# Patient Record
Sex: FEMALE | Race: WHITE | HISPANIC OR LATINO | Employment: OTHER | URBAN - METROPOLITAN AREA
[De-identification: names, ages, dates, MRNs, and addresses within clinical notes are randomized per-mention and may not be internally consistent; named-entity substitution may affect disease eponyms.]

---

## 2017-05-28 ENCOUNTER — HOSPITAL ENCOUNTER (EMERGENCY)
Facility: MEDICAL CENTER | Age: 22
End: 2017-05-28
Payer: MEDICAID

## 2017-05-28 VITALS
HEIGHT: 59 IN | BODY MASS INDEX: 37.91 KG/M2 | RESPIRATION RATE: 18 BRPM | OXYGEN SATURATION: 100 % | TEMPERATURE: 98.5 F | WEIGHT: 188.05 LBS | HEART RATE: 75 BPM | DIASTOLIC BLOOD PRESSURE: 67 MMHG | SYSTOLIC BLOOD PRESSURE: 113 MMHG

## 2017-05-28 PROCEDURE — 302449 STATCHG TRIAGE ONLY (STATISTIC)

## 2017-05-28 ASSESSMENT — PAIN SCALES - GENERAL: PAINLEVEL_OUTOF10: 4

## 2017-05-28 NOTE — ED NOTES
Stephanie Farnsworth  21 y.o.  Chief Complaint   Patient presents with   • Dizziness   • N/V     Pt 7 wks pregnant unable to keep down fluids, sts feeling dizzy n/v. Pt feels dehydrated. vss no acute distress noted. Pt explained triage process. Pt aware to inform staff of any changes.

## 2017-11-03 ENCOUNTER — HOSPITAL ENCOUNTER (OUTPATIENT)
Facility: MEDICAL CENTER | Age: 22
End: 2017-11-03
Attending: OBSTETRICS & GYNECOLOGY | Admitting: OBSTETRICS & GYNECOLOGY
Payer: MEDICAID

## 2017-11-29 ENCOUNTER — APPOINTMENT (OUTPATIENT)
Dept: ADMISSIONS | Facility: MEDICAL CENTER | Age: 22
End: 2017-11-29
Attending: OBSTETRICS & GYNECOLOGY
Payer: MEDICAID

## 2017-12-22 ENCOUNTER — HOSPITAL ENCOUNTER (INPATIENT)
Dept: HOSPITAL 8 - LDIP | Age: 22
LOS: 2 days | Discharge: HOME | End: 2017-12-24
Attending: OBSTETRICS & GYNECOLOGY | Admitting: OBSTETRICS & GYNECOLOGY
Payer: MEDICAID

## 2017-12-22 VITALS — BODY MASS INDEX: 39.47 KG/M2 | WEIGHT: 195.77 LBS | HEIGHT: 59 IN

## 2017-12-22 VITALS — DIASTOLIC BLOOD PRESSURE: 69 MMHG | SYSTOLIC BLOOD PRESSURE: 118 MMHG

## 2017-12-22 DIAGNOSIS — K83.1: ICD-10-CM

## 2017-12-22 DIAGNOSIS — J45.909: ICD-10-CM

## 2017-12-22 DIAGNOSIS — Z87.891: ICD-10-CM

## 2017-12-22 DIAGNOSIS — O34.211: Primary | ICD-10-CM

## 2017-12-22 DIAGNOSIS — Z3A.38: ICD-10-CM

## 2017-12-22 LAB
ANISOCYTOSIS BLD QL SMEAR: (no result)
HCT VFR BLD CALC: 30.6 % (ref 34.6–47.8)
HGB BLD-MCNC: 9.5 G/DL (ref 11.7–16.4)
HYPOCHROMIA BLD QL SMEAR: (no result)
MICROCYTES BLD QL SMEAR: (no result)
OVALOCYTES BLD QL SMEAR: (no result)
POLYCHROMASIA BLD QL SMEAR: (no result)
WBC # BLD AUTO: 11.8 X10^3/UL (ref 3.4–10)

## 2017-12-22 PROCEDURE — 36415 COLL VENOUS BLD VENIPUNCTURE: CPT

## 2017-12-22 PROCEDURE — 86850 RBC ANTIBODY SCREEN: CPT

## 2017-12-22 PROCEDURE — 85025 COMPLETE CBC W/AUTO DIFF WBC: CPT

## 2017-12-22 PROCEDURE — 86900 BLOOD TYPING SEROLOGIC ABO: CPT

## 2017-12-22 PROCEDURE — 82803 BLOOD GASES ANY COMBINATION: CPT

## 2017-12-22 RX ADMIN — FENTANYL CITRATE PRN MCG: 50 INJECTION INTRAMUSCULAR; INTRAVENOUS at 20:26

## 2017-12-22 RX ADMIN — OXYCODONE HYDROCHLORIDE AND ACETAMINOPHEN PRN TAB: 5; 325 TABLET ORAL at 23:58

## 2017-12-22 RX ADMIN — FENTANYL CITRATE PRN MCG: 50 INJECTION INTRAMUSCULAR; INTRAVENOUS at 21:04

## 2017-12-22 RX ADMIN — Medication SCH MLS/HR: at 19:25

## 2017-12-22 RX ADMIN — FENTANYL CITRATE PRN MCG: 50 INJECTION INTRAMUSCULAR; INTRAVENOUS at 20:38

## 2017-12-22 RX ADMIN — SODIUM CHLORIDE, SODIUM LACTATE, POTASSIUM CHLORIDE, AND CALCIUM CHLORIDE SCH MLS/HR: .6; .31; .03; .02 INJECTION, SOLUTION INTRAVENOUS at 19:55

## 2017-12-22 RX ADMIN — FENTANYL CITRATE PRN MCG: 50 INJECTION INTRAMUSCULAR; INTRAVENOUS at 20:07

## 2017-12-22 RX ADMIN — SODIUM CHLORIDE, SODIUM LACTATE, POTASSIUM CHLORIDE, AND CALCIUM CHLORIDE SCH MLS/HR: .6; .31; .03; .02 INJECTION, SOLUTION INTRAVENOUS at 19:25

## 2017-12-23 VITALS — SYSTOLIC BLOOD PRESSURE: 104 MMHG | DIASTOLIC BLOOD PRESSURE: 60 MMHG

## 2017-12-23 VITALS — DIASTOLIC BLOOD PRESSURE: 69 MMHG | SYSTOLIC BLOOD PRESSURE: 107 MMHG

## 2017-12-23 VITALS — DIASTOLIC BLOOD PRESSURE: 62 MMHG | SYSTOLIC BLOOD PRESSURE: 99 MMHG

## 2017-12-23 VITALS — DIASTOLIC BLOOD PRESSURE: 72 MMHG | SYSTOLIC BLOOD PRESSURE: 108 MMHG

## 2017-12-23 VITALS — SYSTOLIC BLOOD PRESSURE: 101 MMHG | DIASTOLIC BLOOD PRESSURE: 63 MMHG

## 2017-12-23 LAB
ANISOCYTOSIS BLD QL SMEAR: (no result)
HCT VFR BLD CALC: 23.9 % (ref 34.6–47.8)
HGB BLD-MCNC: 7.4 G/DL (ref 11.7–16.4)
HYPOCHROMIA BLD QL SMEAR: (no result)
MICROCYTES BLD QL SMEAR: (no result)
OVALOCYTES BLD QL SMEAR: (no result)
POLYCHROMASIA BLD QL SMEAR: (no result)
WBC # BLD AUTO: 9.1 X10^3/UL (ref 3.4–10)

## 2017-12-23 RX ADMIN — Medication SCH MLS/HR: at 05:25

## 2017-12-23 RX ADMIN — IBUPROFEN PRN MG: 200 TABLET, FILM COATED ORAL at 11:04

## 2017-12-23 RX ADMIN — OXYCODONE HYDROCHLORIDE AND ACETAMINOPHEN PRN TAB: 5; 325 TABLET ORAL at 12:33

## 2017-12-23 RX ADMIN — FERROUS SULFATE TAB 325 MG (65 MG ELEMENTAL FE) SCH MG: 325 (65 FE) TAB at 07:44

## 2017-12-23 RX ADMIN — OXYCODONE HYDROCHLORIDE AND ACETAMINOPHEN PRN TAB: 5; 325 TABLET ORAL at 04:00

## 2017-12-23 RX ADMIN — IBUPROFEN PRN MG: 200 TABLET, FILM COATED ORAL at 16:36

## 2017-12-23 RX ADMIN — KETOROLAC TROMETHAMINE SCH MG: 30 INJECTION, SOLUTION INTRAMUSCULAR at 07:44

## 2017-12-23 RX ADMIN — KETOROLAC TROMETHAMINE SCH MG: 30 INJECTION, SOLUTION INTRAMUSCULAR at 01:32

## 2017-12-23 RX ADMIN — KETOROLAC TROMETHAMINE SCH MG: 30 INJECTION, SOLUTION INTRAMUSCULAR at 01:30

## 2017-12-23 RX ADMIN — IBUPROFEN PRN MG: 200 TABLET, FILM COATED ORAL at 22:37

## 2017-12-23 RX ADMIN — SODIUM CHLORIDE, SODIUM LACTATE, POTASSIUM CHLORIDE, AND CALCIUM CHLORIDE SCH MLS/HR: .6; .31; .03; .02 INJECTION, SOLUTION INTRAVENOUS at 05:25

## 2017-12-23 RX ADMIN — DOCUSATE SODIUM PRN MG: 100 CAPSULE, LIQUID FILLED ORAL at 20:33

## 2017-12-23 RX ADMIN — OXYCODONE HYDROCHLORIDE AND ACETAMINOPHEN PRN TAB: 5; 325 TABLET ORAL at 20:33

## 2017-12-23 RX ADMIN — DOCUSATE SODIUM PRN MG: 100 CAPSULE, LIQUID FILLED ORAL at 07:44

## 2017-12-23 RX ADMIN — FERROUS SULFATE TAB 325 MG (65 MG ELEMENTAL FE) SCH MG: 325 (65 FE) TAB at 16:35

## 2017-12-23 RX ADMIN — SODIUM CHLORIDE, SODIUM LACTATE, POTASSIUM CHLORIDE, AND CALCIUM CHLORIDE SCH MLS/HR: .6; .31; .03; .02 INJECTION, SOLUTION INTRAVENOUS at 03:25

## 2017-12-23 RX ADMIN — OXYCODONE HYDROCHLORIDE AND ACETAMINOPHEN PRN TAB: 5; 325 TABLET ORAL at 16:36

## 2017-12-23 RX ADMIN — PRENATAL VIT W/ FE FUMARATE-FA TAB 27-0.8 MG SCH EACH: 27-0.8 TAB at 07:45

## 2017-12-23 RX ADMIN — OXYCODONE HYDROCHLORIDE AND ACETAMINOPHEN PRN TAB: 5; 325 TABLET ORAL at 07:45

## 2017-12-24 RX ADMIN — IBUPROFEN PRN MG: 200 TABLET, FILM COATED ORAL at 04:24

## 2017-12-24 RX ADMIN — OXYCODONE HYDROCHLORIDE AND ACETAMINOPHEN PRN TAB: 10; 325 TABLET ORAL at 01:12

## 2017-12-24 RX ADMIN — OXYCODONE HYDROCHLORIDE AND ACETAMINOPHEN PRN TAB: 10; 325 TABLET ORAL at 07:26

## 2017-12-24 RX ADMIN — PRENATAL VIT W/ FE FUMARATE-FA TAB 27-0.8 MG SCH EACH: 27-0.8 TAB at 07:26

## 2017-12-24 RX ADMIN — FERROUS SULFATE TAB 325 MG (65 MG ELEMENTAL FE) SCH MG: 325 (65 FE) TAB at 07:26

## 2017-12-24 RX ADMIN — OXYCODONE HYDROCHLORIDE AND ACETAMINOPHEN PRN TAB: 10; 325 TABLET ORAL at 12:01

## 2017-12-24 RX ADMIN — DOCUSATE SODIUM PRN MG: 100 CAPSULE, LIQUID FILLED ORAL at 07:27

## 2020-12-09 ENCOUNTER — APPOINTMENT (OUTPATIENT)
Dept: OBGYN | Facility: CLINIC | Age: 25
End: 2020-12-09

## 2021-01-06 ENCOUNTER — INITIAL PRENATAL (OUTPATIENT)
Dept: OBGYN | Facility: CLINIC | Age: 26
End: 2021-01-06

## 2021-01-06 VITALS
WEIGHT: 187 LBS | DIASTOLIC BLOOD PRESSURE: 70 MMHG | SYSTOLIC BLOOD PRESSURE: 122 MMHG | BODY MASS INDEX: 36.71 KG/M2 | HEIGHT: 60 IN

## 2021-01-06 DIAGNOSIS — O09.892 SUPERVISION OF OTHER HIGH RISK PREGNANCIES, SECOND TRIMESTER: Primary | ICD-10-CM

## 2021-01-06 DIAGNOSIS — J45.909 ASTHMA AFFECTING PREGNANCY IN SECOND TRIMESTER: ICD-10-CM

## 2021-01-06 DIAGNOSIS — Z87.59 HISTORY OF CHOLESTASIS DURING PREGNANCY: ICD-10-CM

## 2021-01-06 DIAGNOSIS — E06.3 HASHIMOTO'S DISEASE: ICD-10-CM

## 2021-01-06 DIAGNOSIS — O99.512 ASTHMA AFFECTING PREGNANCY IN SECOND TRIMESTER: ICD-10-CM

## 2021-01-06 DIAGNOSIS — Z98.891 HISTORY OF CESAREAN SECTION: ICD-10-CM

## 2021-01-06 DIAGNOSIS — Z87.19 HISTORY OF CHOLESTASIS DURING PREGNANCY: ICD-10-CM

## 2021-01-06 LAB
APPEARANCE UR: NORMAL
BILIRUB UR STRIP-MCNC: NORMAL MG/DL
COLOR UR AUTO: NORMAL
GLUCOSE UR STRIP.AUTO-MCNC: NEGATIVE MG/DL
KETONES UR STRIP.AUTO-MCNC: NEGATIVE MG/DL
LEUKOCYTE ESTERASE UR QL STRIP.AUTO: NEGATIVE
NITRITE UR QL STRIP.AUTO: NEGATIVE
PH UR STRIP.AUTO: 7 [PH] (ref 5–8)
PROT UR QL STRIP: NEGATIVE MG/DL
RBC UR QL AUTO: NEGATIVE
SP GR UR STRIP.AUTO: 1.01
UROBILINOGEN UR STRIP-MCNC: NORMAL MG/DL

## 2021-01-06 PROCEDURE — 81002 URINALYSIS NONAUTO W/O SCOPE: CPT | Performed by: NURSE PRACTITIONER

## 2021-01-06 PROCEDURE — 59402 PR NEW OB HIGH RISK: CPT | Performed by: NURSE PRACTITIONER

## 2021-01-06 ASSESSMENT — EDINBURGH POSTNATAL DEPRESSION SCALE (EPDS)
THINGS HAVE BEEN GETTING ON TOP OF ME: NO, MOST OF THE TIME I HAVE COPED QUITE WELL
I HAVE FELT SAD OR MISERABLE: NOT VERY OFTEN
I HAVE BEEN ABLE TO LAUGH AND SEE THE FUNNY SIDE OF THINGS: AS MUCH AS I ALWAYS COULD
I HAVE FELT SCARED OR PANICKY FOR NO GOOD REASON: NO, NOT MUCH
I HAVE BEEN SO UNHAPPY THAT I HAVE HAD DIFFICULTY SLEEPING: NOT AT ALL
I HAVE BLAMED MYSELF UNNECESSARILY WHEN THINGS WENT WRONG: NOT VERY OFTEN
TOTAL SCORE: 6
THE THOUGHT OF HARMING MYSELF HAS OCCURRED TO ME: NEVER
I HAVE BEEN SO UNHAPPY THAT I HAVE BEEN CRYING: ONLY OCCASIONALLY
I HAVE BEEN ANXIOUS OR WORRIED FOR NO GOOD REASON: HARDLY EVER
I HAVE LOOKED FORWARD WITH ENJOYMENT TO THINGS: AS MUCH AS I EVER DID

## 2021-01-06 NOTE — PATIENT INSTRUCTIONS
P:  1.  GC/CT ordered. Pap deferred per pt request.         2.  Prenatal labs, including bile acids ordered - lab slip given        3.  Discussed PNV, diet, and adequate water intake        4.  NOB packet given        5.  Return to office in 4 wks        6.  Complete OB US in 3 wks        7.  First trimester screening - too late.        8.  AFP declined.

## 2021-01-06 NOTE — PROGRESS NOTES
NOB today. Sure about LMP.  Last pap:2017= negative  Phone # [phone]  Pharmacy confirmed  On PNV  Cystic Fibrosis test offered.  Flu vaccine declined  States she treats Hashimoto disease with natural supplements. States had Thyroid blood work done  10/2020  AFP declined  C/o cramping.

## 2021-01-06 NOTE — PROGRESS NOTES
Subjective:   Stephanie Farnsworth is a 25 y.o. /[place]  @ EGA: 17w2d ANDI: Estimated Date of Delivery: 21  per LNMP who presents for her new OB exam.  She c/o cramping.  Declines AFP.  Declines CF.  Reports good FM.  Denies VB, LOF, or cramping.  Denies dysuria, vaginal DC.  Pt is  and lives with  and children. Works as a personal caregiver.  Pregnancy is unplanned but wanted.  Declines pap today.  Reports hx of cholestasis with both of her deliveries.  [facility] as she states her doctor didn't deliver her when her labs were abnormal.  Hx of Hashimoto's - just had thyroid labs done in Oct.  Treats with diet and supplements.  Hx of CS x 2 - for RCS. Declines flu vaccine today.     Initial Prenatal Visit          HPI  Review of Systems   All other systems reviewed and are negative.       Objective:     /70   Ht 1.524 m (5')   Wt 84.8 kg (187 lb)   LMP 2020 (Exact Date)   BMI 36.52 kg/m²          Wet mount: deferred        FHTs: 150        Fundal ht: 18       Physical Exam  Vitals signs and nursing note reviewed. Exam conducted with a chaperone present.   Constitutional:       Appearance: Normal appearance. She is well-developed.   HENT:      Head: Normocephalic and atraumatic.   Eyes:      Comments: Eye and ear exam deferred   Neck:      Musculoskeletal: Normal range of motion and neck supple.      Thyroid: No thyromegaly.   Cardiovascular:      Rate and Rhythm: Normal rate and regular rhythm.      Heart sounds: Normal heart sounds.   Pulmonary:      Effort: Pulmonary effort is normal.      Breath sounds: Normal breath sounds.   Chest:      Breasts:         Right: Normal. No inverted nipple, mass, nipple discharge, skin change or tenderness.         Left: Normal. No inverted nipple, mass, nipple discharge, skin change or tenderness.   Abdominal:      General: Bowel sounds are normal.      Palpations: Abdomen is soft.       Comments: Gravid   Genitourinary:     Exam position: Supine.      Comments: Deferred  Musculoskeletal: Normal range of motion.   Skin:     General: Skin is warm and dry.      Capillary Refill: Capillary refill takes less than 2 seconds.   Neurological:      General: No focal deficit present.      Mental Status: She is alert and oriented to person, place, and time.   Psychiatric:         Mood and Affect: Mood normal.         Behavior: Behavior normal.         Thought Content: Thought content normal.         Judgment: Judgment normal.                 Assessment/Plan:       A:   1.  IUP @ 17w2d ANDI: Estimated Date of Delivery: 21 per [facility]         2.  S=D        3.    Patient Active Problem List    Diagnosis Date Noted   • History of  section 2021   • Hashimoto's disease    • Supervision of other high risk pregnancies, second trimester    • Asthma affecting pregnancy in second trimester        P:  1.  GC/CT ordered. Pap deferred per pt request.         2.  Prenatal labs, including bile acids ordered - lab slip given        3.  Discussed PNV, diet, and adequate water intake        4.  NOB packet given        5.  Return to office in 4 wks        6.  Complete OB US in 3 wks        7.  First trimester screening - too late.        8.  AFP declined.        9.  Flu vaccine declined.    Chaperone offered and provided by Wilma Frias MA.

## 2021-02-05 ENCOUNTER — APPOINTMENT (OUTPATIENT)
Dept: RADIOLOGY | Facility: IMAGING CENTER | Age: 26
End: 2021-02-05
Attending: NURSE PRACTITIONER

## 2021-02-05 ENCOUNTER — HOSPITAL ENCOUNTER (OUTPATIENT)
Dept: LAB | Facility: MEDICAL CENTER | Age: 26
End: 2021-02-05
Attending: NURSE PRACTITIONER

## 2021-02-05 ENCOUNTER — ROUTINE PRENATAL (OUTPATIENT)
Dept: OBGYN | Facility: CLINIC | Age: 26
End: 2021-02-05

## 2021-02-05 VITALS — BODY MASS INDEX: 36.58 KG/M2 | SYSTOLIC BLOOD PRESSURE: 100 MMHG | DIASTOLIC BLOOD PRESSURE: 60 MMHG | WEIGHT: 187.3 LBS

## 2021-02-05 DIAGNOSIS — Z98.891 HISTORY OF CESAREAN SECTION: ICD-10-CM

## 2021-02-05 DIAGNOSIS — O09.892 SUPERVISION OF OTHER HIGH RISK PREGNANCIES, SECOND TRIMESTER: ICD-10-CM

## 2021-02-05 DIAGNOSIS — E06.3 HASHIMOTO'S DISEASE: ICD-10-CM

## 2021-02-05 DIAGNOSIS — J45.909 ASTHMA AFFECTING PREGNANCY IN SECOND TRIMESTER: ICD-10-CM

## 2021-02-05 DIAGNOSIS — O99.512 ASTHMA AFFECTING PREGNANCY IN SECOND TRIMESTER: ICD-10-CM

## 2021-02-05 DIAGNOSIS — Z87.59 HISTORY OF CHOLESTASIS DURING PREGNANCY: ICD-10-CM

## 2021-02-05 DIAGNOSIS — Z87.19 HISTORY OF CHOLESTASIS DURING PREGNANCY: ICD-10-CM

## 2021-02-05 DIAGNOSIS — K42.9 UMBILICAL HERNIA WITHOUT OBSTRUCTION AND WITHOUT GANGRENE: ICD-10-CM

## 2021-02-05 LAB
ABO GROUP BLD: NORMAL
BLD GP AB SCN SERPL QL: NORMAL
RH BLD: NORMAL

## 2021-02-05 PROCEDURE — 36415 COLL VENOUS BLD VENIPUNCTURE: CPT

## 2021-02-05 PROCEDURE — 86762 RUBELLA ANTIBODY: CPT

## 2021-02-05 PROCEDURE — 80307 DRUG TEST PRSMV CHEM ANLYZR: CPT

## 2021-02-05 PROCEDURE — 85025 COMPLETE CBC W/AUTO DIFF WBC: CPT

## 2021-02-05 PROCEDURE — 82239 BILE ACIDS TOTAL: CPT

## 2021-02-05 PROCEDURE — 87340 HEPATITIS B SURFACE AG IA: CPT

## 2021-02-05 PROCEDURE — 86850 RBC ANTIBODY SCREEN: CPT

## 2021-02-05 PROCEDURE — 81003 URINALYSIS AUTO W/O SCOPE: CPT

## 2021-02-05 PROCEDURE — 87591 N.GONORRHOEAE DNA AMP PROB: CPT

## 2021-02-05 PROCEDURE — 86900 BLOOD TYPING SEROLOGIC ABO: CPT

## 2021-02-05 PROCEDURE — 86780 TREPONEMA PALLIDUM: CPT

## 2021-02-05 PROCEDURE — 90040 PR PRENATAL FOLLOW UP: CPT | Performed by: OBSTETRICS & GYNECOLOGY

## 2021-02-05 PROCEDURE — 87491 CHLMYD TRACH DNA AMP PROBE: CPT

## 2021-02-05 PROCEDURE — 76805 OB US >/= 14 WKS SNGL FETUS: CPT | Mod: TC | Performed by: NURSE PRACTITIONER

## 2021-02-05 PROCEDURE — 86803 HEPATITIS C AB TEST: CPT

## 2021-02-05 PROCEDURE — 87389 HIV-1 AG W/HIV-1&-2 AB AG IA: CPT

## 2021-02-05 PROCEDURE — 86901 BLOOD TYPING SEROLOGIC RH(D): CPT

## 2021-02-05 NOTE — PROGRESS NOTES
Chief complaint: Return visit    S: Pt presents for routine OB follow up.  No contractions, vaginal bleeding, or leaking fluids. Good fetal movement.    Patient has history of cholestasis of pregnancy with her prior to pregnancies.  Deliveries at 37 and 38 weeks gestation due to cholestasis    Also complaining of a lump around her umbilical area.  Concerning for possible umbilical hernia    Questions answered.    O: /60   Wt 85 kg (187 lb 4.8 oz)   LMP 2020 (Exact Date)   BMI 36.58 kg/m²   Patients' weight gain, fluid intake and exercise level discussed.  Vitals, fundal height , fetal position, and FHR reviewed on flowsheet    Lab:No results found for this or any previous visit (from the past 336 hour(s)).    A/P:  25 y.o.  at 21w4d presents for routine obstetric follow-up.  Size equals dates and/or scan    1.  Continue prenatal vitamins.  2.  Begin kick counts at 28 weeks.  3.  Exercise at least 30 minutes daily.  4.  Drink at least 2 Liters of water daily  5.  Follow-up in 3 weeks.    We will monitor bile acid levels during the pregnancy.    Patient will bring her thyroid labs at next visit.    Repeat  at 39 weeks gestation or earlier if intrahepatic cholestasis of pregnancy reoccurs    Referral to general surgery sent given concerns for umbilical hernia.  Area is slightly tender to palpation.  No evidence of strangulation.  There is an approximately 3 x 3 cm defect noted.

## 2021-02-05 NOTE — PROGRESS NOTES
OB follow up   + fetal movement. Active  No VB, LOF or UC's.  Flu vaccine offered Declined   Phone #  [phone]  Preferred pharmacy confirmed.  C/o  Cramping  Plans on doing labs after this appt

## 2021-02-06 LAB
C TRACH DNA SPEC QL NAA+PROBE: NEGATIVE
N GONORRHOEA DNA SPEC QL NAA+PROBE: NEGATIVE
SPECIMEN SOURCE: NORMAL

## 2021-02-07 LAB
AMPHET CTO UR CFM-MCNC: NEGATIVE NG/ML
BARBITURATES CTO UR CFM-MCNC: NEGATIVE NG/ML
BENZODIAZ CTO UR CFM-MCNC: NEGATIVE NG/ML
BILE AC SERPL-SCNC: 10 UMOL/L (ref 0–10)
CANNABINOIDS CTO UR CFM-MCNC: NEGATIVE NG/ML
COCAINE CTO UR CFM-MCNC: NEGATIVE NG/ML
DRUG COMMENT 753798: NORMAL
HCV AB SER QL: NORMAL
HIV 1+2 AB+HIV1 P24 AG SERPL QL IA: NORMAL
METHADONE CTO UR CFM-MCNC: NEGATIVE NG/ML
OPIATES CTO UR CFM-MCNC: NEGATIVE NG/ML
PCP CTO UR CFM-MCNC: NEGATIVE NG/ML
PROPOXYPH CTO UR CFM-MCNC: NEGATIVE NG/ML

## 2021-02-08 LAB
APPEARANCE UR: CLEAR
BASOPHILS # BLD AUTO: 0.4 % (ref 0–1.8)
BASOPHILS # BLD: 0.03 K/UL (ref 0–0.12)
BILIRUB UR QL STRIP.AUTO: NEGATIVE
COLOR UR: YELLOW
EOSINOPHIL # BLD AUTO: 0.08 K/UL (ref 0–0.51)
EOSINOPHIL NFR BLD: 1 % (ref 0–6.9)
ERYTHROCYTE [DISTWIDTH] IN BLOOD BY AUTOMATED COUNT: 44 FL (ref 35.9–50)
GLUCOSE UR STRIP.AUTO-MCNC: NEGATIVE MG/DL
HBV SURFACE AG SER QL: ABNORMAL
HCT VFR BLD AUTO: 39.4 % (ref 37–47)
HGB BLD-MCNC: 13.1 G/DL (ref 12–16)
IMM GRANULOCYTES # BLD AUTO: 0.05 K/UL (ref 0–0.11)
IMM GRANULOCYTES NFR BLD AUTO: 0.6 % (ref 0–0.9)
KETONES UR STRIP.AUTO-MCNC: NEGATIVE MG/DL
LEUKOCYTE ESTERASE UR QL STRIP.AUTO: NEGATIVE
LYMPHOCYTES # BLD AUTO: 1.78 K/UL (ref 1–4.8)
LYMPHOCYTES NFR BLD: 21.2 % (ref 22–41)
MCH RBC QN AUTO: 29.8 PG (ref 27–33)
MCHC RBC AUTO-ENTMCNC: 33.2 G/DL (ref 33.6–35)
MCV RBC AUTO: 89.5 FL (ref 81.4–97.8)
MICRO URNS: ABNORMAL
MONOCYTES # BLD AUTO: 0.53 K/UL (ref 0–0.85)
MONOCYTES NFR BLD AUTO: 6.3 % (ref 0–13.4)
NEUTROPHILS # BLD AUTO: 5.91 K/UL (ref 2–7.15)
NEUTROPHILS NFR BLD: 70.5 % (ref 44–72)
NITRITE UR QL STRIP.AUTO: NEGATIVE
NRBC # BLD AUTO: 0 K/UL
NRBC BLD-RTO: 0 /100 WBC
PH UR STRIP.AUTO: 7 [PH] (ref 5–8)
PLATELET # BLD AUTO: 243 K/UL (ref 164–446)
PMV BLD AUTO: 11.5 FL (ref 9–12.9)
PROT UR QL STRIP: NEGATIVE MG/DL
RBC # BLD AUTO: 4.4 M/UL (ref 4.2–5.4)
RBC UR QL AUTO: NEGATIVE
RUBV AB SER QL: 108 IU/ML
SP GR UR STRIP.AUTO: 1.01
TREPONEMA PALLIDUM IGG+IGM AB [PRESENCE] IN SERUM OR PLASMA BY IMMUNOASSAY: ABNORMAL
UROBILINOGEN UR STRIP.AUTO-MCNC: 0.2 MG/DL
WBC # BLD AUTO: 8.4 K/UL (ref 4.8–10.8)

## 2021-02-09 DIAGNOSIS — Z87.59 HISTORY OF CHOLESTASIS DURING PREGNANCY: Primary | ICD-10-CM

## 2021-02-09 DIAGNOSIS — Z87.19 HISTORY OF CHOLESTASIS DURING PREGNANCY: Primary | ICD-10-CM

## 2021-03-01 ENCOUNTER — ROUTINE PRENATAL (OUTPATIENT)
Dept: OBGYN | Facility: CLINIC | Age: 26
End: 2021-03-01

## 2021-03-01 VITALS — SYSTOLIC BLOOD PRESSURE: 118 MMHG | BODY MASS INDEX: 37.24 KG/M2 | WEIGHT: 190.7 LBS | DIASTOLIC BLOOD PRESSURE: 74 MMHG

## 2021-03-01 DIAGNOSIS — Z87.19 HISTORY OF CHOLESTASIS DURING PREGNANCY: ICD-10-CM

## 2021-03-01 DIAGNOSIS — Z87.59 HISTORY OF CHOLESTASIS DURING PREGNANCY: ICD-10-CM

## 2021-03-01 DIAGNOSIS — Z3A.25 25 WEEKS GESTATION OF PREGNANCY: ICD-10-CM

## 2021-03-01 DIAGNOSIS — O09.892 SUPERVISION OF OTHER HIGH RISK PREGNANCIES, SECOND TRIMESTER: ICD-10-CM

## 2021-03-01 DIAGNOSIS — Z98.891 HISTORY OF CESAREAN SECTION: ICD-10-CM

## 2021-03-01 PROCEDURE — 90040 PR PRENATAL FOLLOW UP: CPT | Performed by: OBSTETRICS & GYNECOLOGY

## 2021-03-01 NOTE — PROGRESS NOTES
Pt. Here for OB/FU. Reports Good FM.   Good # [phone]  Pt. Denies VB, LOF, or UC's.   Pharmacy verified.   Chaperone offered and not indicated  Pt states some cramping.

## 2021-03-01 NOTE — PROGRESS NOTES
Complaint: Return visit    S: Pt presents for routine OB follow up.  No contractions, vaginal bleeding, or leaking fluids. Good fetal movement.    Patient reports some pruritus which has been going on since her last visit.    History of cholestasis with her prior 2 pregnancies    Questions answered.    O: /74   Wt 86.5 kg (190 lb 11.2 oz)   LMP 2020 (Exact Date)   BMI 37.24 kg/m²   Patients' weight gain, fluid intake and exercise level discussed.  Vitals, fundal height , fetal position, and FHR reviewed on flowsheet    Lab:No results found for this or any previous visit (from the past 336 hour(s)).    A/P:  25 y.o.  at 25w0d presents for routine obstetric follow-up.  Size equals dates and/or scan    1.  Continue prenatal vitamins.  2.  Begin kick counts at 28 weeks.  3.  Exercise at least 30 minutes daily.  4.  Drink at least 2 Liters of water daily  5.  Follow-up in 3 weeks.    CBC, RPR and 1 hour Glucola ordered today    Bile acids and CMP ordered today.  If elevated bile acids, will discuss Actigall treatment with patient.  Delivery by 37 weeks gestation if evidence of recurrence of cholestasis    Labs ultrasound reviewed with patient    All questions answered

## 2021-03-10 ENCOUNTER — HOSPITAL ENCOUNTER (OUTPATIENT)
Dept: LAB | Facility: MEDICAL CENTER | Age: 26
End: 2021-03-10
Attending: NURSE PRACTITIONER
Payer: COMMERCIAL

## 2021-03-10 DIAGNOSIS — Z87.19 HISTORY OF CHOLESTASIS DURING PREGNANCY: ICD-10-CM

## 2021-03-10 DIAGNOSIS — Z87.59 HISTORY OF CHOLESTASIS DURING PREGNANCY: ICD-10-CM

## 2021-03-11 LAB
ALBUMIN SERPL BCP-MCNC: 3.9 G/DL (ref 3.2–4.9)
ALBUMIN/GLOB SERPL: 1.3 G/DL
ALP SERPL-CCNC: 107 U/L (ref 30–99)
ALT SERPL-CCNC: 14 U/L (ref 2–50)
ANION GAP SERPL CALC-SCNC: 10 MMOL/L (ref 7–16)
AST SERPL-CCNC: 16 U/L (ref 12–45)
BILIRUB SERPL-MCNC: 0.2 MG/DL (ref 0.1–1.5)
BUN SERPL-MCNC: 5 MG/DL (ref 8–22)
CALCIUM SERPL-MCNC: 9.3 MG/DL (ref 8.5–10.5)
CHLORIDE SERPL-SCNC: 104 MMOL/L (ref 96–112)
CO2 SERPL-SCNC: 24 MMOL/L (ref 20–33)
CREAT SERPL-MCNC: 0.48 MG/DL (ref 0.5–1.4)
FASTING STATUS PATIENT QL REPORTED: NORMAL
GLOBULIN SER CALC-MCNC: 3 G/DL (ref 1.9–3.5)
GLUCOSE SERPL-MCNC: 85 MG/DL (ref 65–99)
POTASSIUM SERPL-SCNC: 4.1 MMOL/L (ref 3.6–5.5)
PROT SERPL-MCNC: 6.9 G/DL (ref 6–8.2)
SODIUM SERPL-SCNC: 138 MMOL/L (ref 135–145)

## 2021-03-13 LAB — BILE AC SERPL-SCNC: 15 UMOL/L (ref 0–10)

## 2021-03-16 RX ORDER — URSODIOL 300 MG/1
300 CAPSULE ORAL 3 TIMES DAILY
Qty: 90 CAPSULE | Refills: 6 | Status: SHIPPED | OUTPATIENT
Start: 2021-03-16 | End: 2021-05-13 | Stop reason: SDUPTHER

## 2021-04-16 ENCOUNTER — ROUTINE PRENATAL (OUTPATIENT)
Dept: OBGYN | Facility: CLINIC | Age: 26
End: 2021-04-16

## 2021-04-16 VITALS — BODY MASS INDEX: 36.72 KG/M2 | SYSTOLIC BLOOD PRESSURE: 100 MMHG | DIASTOLIC BLOOD PRESSURE: 58 MMHG | WEIGHT: 188 LBS

## 2021-04-16 DIAGNOSIS — O24.414 INSULIN CONTROLLED GESTATIONAL DIABETES MELLITUS (GDM) IN THIRD TRIMESTER: ICD-10-CM

## 2021-04-16 DIAGNOSIS — O09.892 SUPERVISION OF OTHER HIGH RISK PREGNANCIES, SECOND TRIMESTER: ICD-10-CM

## 2021-04-16 DIAGNOSIS — Z98.891 HISTORY OF CESAREAN SECTION: ICD-10-CM

## 2021-04-16 DIAGNOSIS — O26.613 CHOLESTASIS DURING PREGNANCY IN THIRD TRIMESTER: ICD-10-CM

## 2021-04-16 DIAGNOSIS — K83.1 CHOLESTASIS DURING PREGNANCY IN THIRD TRIMESTER: ICD-10-CM

## 2021-04-16 PROCEDURE — 90040 PR PRENATAL FOLLOW UP: CPT | Performed by: OBSTETRICS & GYNECOLOGY

## 2021-04-16 ASSESSMENT — FIBROSIS 4 INDEX: FIB4 SCORE: 0.48

## 2021-04-16 NOTE — PROGRESS NOTES
Pt here today for OB follow up  Pt states no VB or LOF but having some cramping   Reports +FM  Good # [phone]   Pharmacy Confirmed.  Declined Tdap Today

## 2021-04-16 NOTE — NON-PROVIDER
Pt was given 1 vial of NPH insulin today.  Reminded pt that insulin is only good for 31 days and to discard after that.  Lot # ***  Exp date: ***

## 2021-04-16 NOTE — PROGRESS NOTES
Chief complaint: Return visit    S: Pt presents for routine OB follow up. Good fetal movement.  No contractions, vaginal bleeding, or leakage of fluid.    Questions answered.    Patient diagnosed with cholestasis of pregnancy.  Continues to have significant itching.  Was started on ursodiol.    Review of her sugar logs show elevated fasting levels throughout most readings.  Normal 1 hour postprandials.    O: /58   Wt 85.3 kg (188 lb)   LMP 2020 (Exact Date)   BMI 36.72 kg/m²   Patients' weight gain, fluid intake and exercise level discussed.  Vitals, fundal height , fetal position, and FHR reviewed on flowsheet    Lab:No results found for this or any previous visit (from the past 336 hour(s)).    A/P:  25 y.o.  at 31w4d presents for routine obstetric follow-up.  Size equals dates and/or scan    1.  Continue prenatal vitamins.  2.  Fetal kick counts.  3.  Exercise at least 30 minutes daily.  4.  Drink at least 2L of water daily  5.  Labor precautions educated.  6.  Follow-up in 1 weeks.  7.  GBS at 35 weeks    Given her elevated levels, had discussion with patient regarding gestational diabetes diagnosis.  During discussion, patient reports she eats a mostly a paleo diet with very little carbohydrates and continues to have elevated fasting blood levels.  Discussed the patient this time to recommendations to possibly begin insulin as she is already on a low carbohydrate diet.  We will begin with 5 units of NPH at night.    Begin  testing next week due to history of cholestasis as well as GDM A2 now.  A1c levels ordered today    Plan for delivery at 37 weeks gestation unless severely elevated bile acid levels.  We will recheck bile acid levels and liver function test today.  Follow-up growth scan ordered     delivery request sent for

## 2021-04-20 ENCOUNTER — HOSPITAL ENCOUNTER (OUTPATIENT)
Dept: LAB | Facility: MEDICAL CENTER | Age: 26
End: 2021-04-20
Attending: OBSTETRICS & GYNECOLOGY
Payer: COMMERCIAL

## 2021-04-20 ENCOUNTER — ROUTINE PRENATAL (OUTPATIENT)
Dept: OBGYN | Facility: CLINIC | Age: 26
End: 2021-04-20

## 2021-04-20 DIAGNOSIS — O24.414 INSULIN CONTROLLED GESTATIONAL DIABETES MELLITUS (GDM) IN THIRD TRIMESTER: ICD-10-CM

## 2021-04-20 DIAGNOSIS — O26.613 CHOLESTASIS DURING PREGNANCY IN THIRD TRIMESTER: ICD-10-CM

## 2021-04-20 DIAGNOSIS — K83.1 CHOLESTASIS DURING PREGNANCY IN THIRD TRIMESTER: ICD-10-CM

## 2021-04-20 LAB
NST ACOUSTIC STIMULATION: NORMAL
NST ACTION NECESSARY: NORMAL
NST ASSESSMENT: NORMAL
NST BASELINE: NORMAL
NST INDICATIONS: NORMAL
NST OTHER DATA: NORMAL
NST READ BY: NORMAL
NST RETURN: NORMAL
NST UTERINE ACTIVITY: NORMAL

## 2021-04-20 PROCEDURE — 59025 FETAL NON-STRESS TEST: CPT | Performed by: OBSTETRICS & GYNECOLOGY

## 2021-04-21 LAB
ALBUMIN SERPL BCP-MCNC: 3.7 G/DL (ref 3.2–4.9)
ALBUMIN/GLOB SERPL: 1.2 G/DL
ALP SERPL-CCNC: 136 U/L (ref 30–99)
ALT SERPL-CCNC: 10 U/L (ref 2–50)
ANION GAP SERPL CALC-SCNC: 8 MMOL/L (ref 7–16)
AST SERPL-CCNC: 17 U/L (ref 12–45)
BILIRUB SERPL-MCNC: 0.2 MG/DL (ref 0.1–1.5)
BUN SERPL-MCNC: 6 MG/DL (ref 8–22)
CALCIUM SERPL-MCNC: 9.4 MG/DL (ref 8.5–10.5)
CHLORIDE SERPL-SCNC: 99 MMOL/L (ref 96–112)
CO2 SERPL-SCNC: 25 MMOL/L (ref 20–33)
CREAT SERPL-MCNC: 0.39 MG/DL (ref 0.5–1.4)
EST. AVERAGE GLUCOSE BLD GHB EST-MCNC: 88 MG/DL
GLOBULIN SER CALC-MCNC: 3.1 G/DL (ref 1.9–3.5)
GLUCOSE SERPL-MCNC: 89 MG/DL (ref 65–99)
HBA1C MFR BLD: 4.7 % (ref 4–5.6)
POTASSIUM SERPL-SCNC: 4.5 MMOL/L (ref 3.6–5.5)
PROT SERPL-MCNC: 6.8 G/DL (ref 6–8.2)
SODIUM SERPL-SCNC: 132 MMOL/L (ref 135–145)

## 2021-04-22 LAB — BILE AC SERPL-SCNC: 5 UMOL/L (ref 0–10)

## 2021-04-23 ENCOUNTER — ROUTINE PRENATAL (OUTPATIENT)
Dept: OBGYN | Facility: CLINIC | Age: 26
End: 2021-04-23

## 2021-04-23 ENCOUNTER — APPOINTMENT (OUTPATIENT)
Dept: RADIOLOGY | Facility: IMAGING CENTER | Age: 26
End: 2021-04-23
Attending: OBSTETRICS & GYNECOLOGY

## 2021-04-23 ENCOUNTER — NON-PROVIDER VISIT (OUTPATIENT)
Dept: OBGYN | Facility: CLINIC | Age: 26
End: 2021-04-23

## 2021-04-23 VITALS — DIASTOLIC BLOOD PRESSURE: 64 MMHG | SYSTOLIC BLOOD PRESSURE: 108 MMHG | BODY MASS INDEX: 37.11 KG/M2 | WEIGHT: 190 LBS

## 2021-04-23 DIAGNOSIS — O26.613 CHOLESTASIS DURING PREGNANCY IN THIRD TRIMESTER: ICD-10-CM

## 2021-04-23 DIAGNOSIS — K83.1 CHOLESTASIS DURING PREGNANCY IN THIRD TRIMESTER: ICD-10-CM

## 2021-04-23 DIAGNOSIS — E06.3 HASHIMOTO'S THYROIDITIS: ICD-10-CM

## 2021-04-23 DIAGNOSIS — O24.414 INSULIN CONTROLLED GESTATIONAL DIABETES MELLITUS (GDM) IN THIRD TRIMESTER: ICD-10-CM

## 2021-04-23 LAB
GLUCOSE BLD-MCNC: 101 MG/DL (ref 70–100)
NST ACOUSTIC STIMULATION: NORMAL
NST ACTION NECESSARY: NORMAL
NST ASSESSMENT: NORMAL
NST BASELINE: NORMAL
NST INDICATIONS: NORMAL
NST OTHER DATA: NORMAL
NST READ BY: NORMAL
NST RETURN: NORMAL
NST UTERINE ACTIVITY: NORMAL

## 2021-04-23 PROCEDURE — 82962 GLUCOSE BLOOD TEST: CPT | Performed by: OBSTETRICS & GYNECOLOGY

## 2021-04-23 PROCEDURE — 76816 OB US FOLLOW-UP PER FETUS: CPT | Mod: TC | Performed by: OBSTETRICS & GYNECOLOGY

## 2021-04-23 PROCEDURE — 59025 FETAL NON-STRESS TEST: CPT | Performed by: OBSTETRICS & GYNECOLOGY

## 2021-04-23 PROCEDURE — 90040 PR PRENATAL FOLLOW UP: CPT | Mod: 25 | Performed by: OBSTETRICS & GYNECOLOGY

## 2021-04-23 ASSESSMENT — FIBROSIS 4 INDEX: FIB4 SCORE: .5973191135873605405

## 2021-04-23 NOTE — LETTER
"Count Your Baby's Movements  Another step to a healthy delivery    A Epic Dress Re Test             Dept: [phone]    How Many Weeks Pregnant=32w4d    Date to Begin Countin21              How to use this chart    One way for your physician to keep track of your baby's health is by knowing how often the baby moves (or \"kicks\") in your womb.  You can help your physician to do this by using this chart every day.    Every day, you should see how many hours it takes for your baby to move 10 times.  Start in the morning, as soon as you get up.    · First, write down the time your baby moves until you get to 10.  · Check off one box every time your baby moves until you get to 10.  · Write down the time you finished counting in the last column.  · Total how long it took to count up all 10 movements.  · Finally, fill in the box that shows how long this took.  After counting 10 movements, you no longer have to count any more that day.  The next morning, just start counting again as soon as you get up.    What should you call a \"movement\"?  It is hard to say, because it will feel different from one mother to another and from one pregnancy to the next.  The important thing is that you count the movements the same way throughout your pregnancy.  If you have more questions, you should ask your physician.    Count carefully every day!  SAMPLE:  Week 28    How many hours did it take to feel 10 movements?       Start  Time     1     2     3     4     5     6     7     8     9     10   Finish Time   Mon 8:20 ·  ·  ·  ·  ·  ·  ·  ·  ·  ·  11:40                  Sat               Sun                 IMPORTANT: You should contact your physician if it takes more than two hours for you to feel 10 movements.  Each morning, write down the time and start to count the movements of your baby.  Keep track by checking off one box every time you feel one movement.  When you have " "felt 10 \"kicks\", write down the time you finished counting in the last column.  Then fill in the   box (over the check ketan) for the number of hours it took.  Be sure to read the complete instructions on the previous page.            "

## 2021-04-23 NOTE — PROGRESS NOTES
S: Pt 25 y.o.  32w4d here for diabetic OB follow up.   No contractions, leaking, vaginal bleeding.  Good fetal movement.  Reviewed blood sugar log with patient.  Reviewed diet.  Questions answered.  Patient did not bring her book today however states the fasting averages are 80-90 and that her postprandial averages are 110s to 125.  Patient continues to take Actigall 3 times daily as well as received twice weekly nonstress testing.  She admits to good fetal movement and denies complaints.    Her in office fingerstick 1 hour after eating is 101    Current Insulin regimen  Bedtime: 5 NPH    O:   Vitals:    21 1328   BP: 108/64   Weight: 86.2 kg (190 lb)       A/P:  25 y.o.  32w4d who presents for obstetric follow-up.  Patient Active Problem List   Diagnosis   • Hashimoto's disease   • Supervision of other high risk pregnancies, second trimester   • Asthma affecting pregnancy in second trimester   • History of  section   • History of cholestasis during pregnancy   • Umbilical hernia without obstruction and without gangrene   • Cholestasis during pregnancy in third trimester       # Diabetes in pregnancy  QHS: cont 5 NPH  - NSTs: 2x/week to begin at 32 weeks if on insulin.    AFP declined.  Cholestasis: bile acids initially 15 but were 3 on .  On actigall   2 x week NST  [ ] 37 wk delivery    GDMA2  - on 5NPH bedtime    Hx c/s x 2  [ ] repeat c/s at 37 wks. already scheduled for 2021 at 9:30 AM.  Patient declines bilateral tubal ligation      # Postpartum:   - repeat glucose testing 6 weeks postpartum    - Follow-up in 1 weeks for obstetric diabetic follow-up.

## 2021-04-23 NOTE — PROGRESS NOTES
GDM Class A2 . OB F/U.  + fetal movement  Denies any VB, LO or UC's  Phone # [phone]  Pharmacy confirmed  Diabetic supplies: no need

## 2021-04-26 ENCOUNTER — ROUTINE PRENATAL (OUTPATIENT)
Dept: OBGYN | Facility: CLINIC | Age: 26
End: 2021-04-26

## 2021-04-26 DIAGNOSIS — O24.414 INSULIN CONTROLLED GESTATIONAL DIABETES MELLITUS (GDM) IN THIRD TRIMESTER: ICD-10-CM

## 2021-04-26 PROCEDURE — 90040 PR PRENATAL FOLLOW UP: CPT | Mod: 25 | Performed by: OBSTETRICS & GYNECOLOGY

## 2021-04-26 PROCEDURE — 59025 FETAL NON-STRESS TEST: CPT | Performed by: OBSTETRICS & GYNECOLOGY

## 2021-04-29 ENCOUNTER — NON-PROVIDER VISIT (OUTPATIENT)
Dept: OBGYN | Facility: CLINIC | Age: 26
End: 2021-04-29

## 2021-04-29 ENCOUNTER — NON-PROVIDER VISIT (OUTPATIENT)
Dept: OBGYN | Facility: CLINIC | Age: 26
End: 2021-04-29
Payer: MEDICAID

## 2021-04-29 ENCOUNTER — ROUTINE PRENATAL (OUTPATIENT)
Dept: OBGYN | Facility: CLINIC | Age: 26
End: 2021-04-29
Payer: MEDICAID

## 2021-04-29 VITALS — DIASTOLIC BLOOD PRESSURE: 63 MMHG | BODY MASS INDEX: 37.11 KG/M2 | WEIGHT: 190 LBS | SYSTOLIC BLOOD PRESSURE: 109 MMHG

## 2021-04-29 DIAGNOSIS — O99.213 OBESITY AFFECTING PREGNANCY IN THIRD TRIMESTER: ICD-10-CM

## 2021-04-29 DIAGNOSIS — O24.419 GESTATIONAL DIABETES MELLITUS (GDM), ANTEPARTUM, GESTATIONAL DIABETES METHOD OF CONTROL UNSPECIFIED: ICD-10-CM

## 2021-04-29 DIAGNOSIS — O09.93 PREGNANCY, SUPERVISION, HIGH-RISK, THIRD TRIMESTER: ICD-10-CM

## 2021-04-29 DIAGNOSIS — E06.3 HASHIMOTO'S DISEASE: ICD-10-CM

## 2021-04-29 DIAGNOSIS — K83.1 CHOLESTASIS DURING PREGNANCY IN THIRD TRIMESTER: ICD-10-CM

## 2021-04-29 DIAGNOSIS — O26.613 CHOLESTASIS DURING PREGNANCY IN THIRD TRIMESTER: ICD-10-CM

## 2021-04-29 DIAGNOSIS — Z98.891 HISTORY OF CESAREAN SECTION: ICD-10-CM

## 2021-04-29 DIAGNOSIS — K42.9 UMBILICAL HERNIA WITHOUT OBSTRUCTION AND WITHOUT GANGRENE: ICD-10-CM

## 2021-04-29 DIAGNOSIS — J45.909 ASTHMA AFFECTING PREGNANCY IN SECOND TRIMESTER: ICD-10-CM

## 2021-04-29 DIAGNOSIS — O99.512 ASTHMA AFFECTING PREGNANCY IN SECOND TRIMESTER: ICD-10-CM

## 2021-04-29 DIAGNOSIS — O24.419 GDM, CLASS A2: ICD-10-CM

## 2021-04-29 LAB
GLUCOSE BLD-MCNC: 86 MG/DL (ref 70–100)
NST ACOUSTIC STIMULATION: NO
NST ACTION NECESSARY: NORMAL
NST ASSESSMENT: NORMAL
NST BASELINE: NORMAL
NST INDICATIONS: NORMAL
NST OTHER DATA: NORMAL
NST READ BY: NORMAL
NST RETURN: NORMAL
NST UTERINE ACTIVITY: NORMAL

## 2021-04-29 PROCEDURE — 90040 PR PRENATAL FOLLOW UP: CPT | Mod: 25 | Performed by: OBSTETRICS & GYNECOLOGY

## 2021-04-29 PROCEDURE — 82962 GLUCOSE BLOOD TEST: CPT | Performed by: OBSTETRICS & GYNECOLOGY

## 2021-04-29 PROCEDURE — 59025 FETAL NON-STRESS TEST: CPT | Performed by: OBSTETRICS & GYNECOLOGY

## 2021-04-29 PROCEDURE — 97802 MEDICAL NUTRITION INDIV IN: CPT | Performed by: DIETITIAN, REGISTERED

## 2021-04-29 ASSESSMENT — FIBROSIS 4 INDEX: FIB4 SCORE: .5973191135873605405

## 2021-04-29 NOTE — PROGRESS NOTES
Initial Nutrition Assessment   Referring Provider: Dr. Isbell   Time: 1:53-2:04 pm       Subjective:  - Pt politely declined GDM class referral when diagnosed   - Follows a Paleo diet and with some dietary restrictions including no gluten, dairy, soy, corn and dose occasionally consume grains, white rice, oats, and potatoes   - Does not consume concentrated sweets   - Currently taking 5 units NPH insulin qHS     Diet hx:   B- 1 slice GF brown rice bread, oats with fruit, or sweet potato has   S- 0  L- 2 HB eggs, fruit or lettuce wraps   S- 0  D- roasted veggies and chicken  S- avocado     Objective:   Anthropometrics:  Today's weight: 190 lb   Weeks gestation: 33w3d    Biochemical data, medical test and procedures:  Lab Results   Component Value Date/Time    HBA1C 4.7 04/20/2021 10:49 AM   @  Lab Results   Component Value Date/Time    POCGLUCOSE 86 04/29/2021 01:24 PM       OGTT Results: unknown     Glucose Log Book (most recent):    Fasting glucose range: 76-90   1 hour glucose post prandial range:     Assessment:   Nutrition Diagnosis (PES Statement):  · Altered nutrition related lab values related to endocrine dysfunction as evidenced by OGTT    Recommended Diet:  Pt did not receive meal plan     Assessment Notes:   Discussed difference of GDM vs T2DM and affect of hormones on blood sugar. Pt follows a paleo low carbohydrate dietary pattern and has been checking her blood sugars at the appropriate times 4x a per day. Provided brief review of dietary guidelines including benefits of small frequent meals, continuing to avoid concentrated sweets, including protein/fat with complex carbs, 2-3 hrs between meals/snacks and no longer than 10 hrs overnight. Recommended if appetite low to eating smaller more frequently, trial 15g carb with protein/fat before bedtime, and incorporate some complex carbs in at dinner as tolerated. Briefly reviewed meal timing.     Plan: Monitoring & Evaluation  Goals:  1. Follow  meal plan as outlined above; 3 meals and 3 snacks daily.   2. Check FSBS 4 times a day (fasting and 1 hour after each meal)  3. FSBS Goals= Fasting <90; 1 hour PP <130   4. Bring blood sugar log book to each appointment   5. Appropriate weight gain during pregnancy       Follow up [address], LD  [phone]

## 2021-04-29 NOTE — PROGRESS NOTES
76-90S: Pt here for OB follow up. Doing well.   Reports normal fetal movements.    negative vaginal bleeding.    negative leakage of fluid.    negative contractions.    Reviewed blood sugar log with patient.  Reviewed diet.  Questions answered.    O: VSS Afeb      See prenatal vitals, chart for today's exam    FBS range: 76-90  1hr post prandial range:     Insulin regimen  NPH at bedtime 5U      A/P:  25 y.o.  33w3d with A2 GDM who presents for obstetric follow-up.  Patient is doing well currently.  Size equal to dates.  Diabetes is controlled with insulin.  NST is reactive.  Patient has cholestasis of pregnancy and reports no symptoms currently no estradiol.  She has thyroid function tests pending and states she will go to do labs when she has time.  She has had 2 prior  and has repeat  already scheduled at 37 weeks gestation  Encounter Diagnoses   Name Primary?   • Pregnancy, supervision, high-risk, third trimester    • Hashimoto's disease    • Asthma affecting pregnancy in second trimester    • History of  section    • Umbilical hernia without obstruction and without gangrene    • Cholestasis during pregnancy in third trimester    • Obesity affecting pregnancy in third trimester    • GDM, class A2          1.  Pregnancy precautions, labor precautions and fetal kick counts educated  2.  Continue current insulin  3.  NSTs: 2x/week to continue until delivery.  4.  Continue prenatal vitamins.  5.  Watch weight gain.  Exercise at least 30 minutes daily  6.  Drink at least 2 liters of water daily.  7.  Encouraged prenatal classes.  8.  Follow-up in 1 wk for obstetric follow-up.

## 2021-04-29 NOTE — PROGRESS NOTES
OB follow up - A2GDM  +FM, denies VB, LOF and UC  Phone # [phone]  Preferred pharmacy confirmed  Pt has not been able to get her Thyroid labs done and states she does not know when she can get them done  C/S scheduled for 5/21/21 at 9:30 am  POCT Glucose - 86 (pt ate 2 hard boiled eggs at 12:15)  Pt denies any problems at this time

## 2021-05-04 ENCOUNTER — ROUTINE PRENATAL (OUTPATIENT)
Dept: OBGYN | Facility: CLINIC | Age: 26
End: 2021-05-04
Payer: MEDICAID

## 2021-05-04 ENCOUNTER — NON-PROVIDER VISIT (OUTPATIENT)
Dept: OBGYN | Facility: CLINIC | Age: 26
End: 2021-05-04
Payer: MEDICAID

## 2021-05-04 ENCOUNTER — TELEPHONE (OUTPATIENT)
Dept: OBGYN | Facility: CLINIC | Age: 26
End: 2021-05-04

## 2021-05-04 VITALS — WEIGHT: 189.8 LBS | BODY MASS INDEX: 37.07 KG/M2 | SYSTOLIC BLOOD PRESSURE: 121 MMHG | DIASTOLIC BLOOD PRESSURE: 71 MMHG

## 2021-05-04 DIAGNOSIS — O24.419 GESTATIONAL DIABETES MELLITUS (GDM), ANTEPARTUM, GESTATIONAL DIABETES METHOD OF CONTROL UNSPECIFIED: Primary | ICD-10-CM

## 2021-05-04 DIAGNOSIS — O36.8131 DECREASED FETAL MOVEMENT AFFECTING MANAGEMENT OF PREGNANCY IN THIRD TRIMESTER, FETUS 1: ICD-10-CM

## 2021-05-04 PROCEDURE — 90040 PR PRENATAL FOLLOW UP: CPT | Performed by: NURSE PRACTITIONER

## 2021-05-04 ASSESSMENT — FIBROSIS 4 INDEX: FIB4 SCORE: .5973191135873605405

## 2021-05-04 NOTE — TELEPHONE ENCOUNTER
Pt called stated she missed her NST yesterday due to . Stated that today her baby has not passed the fetal movements. Pt also reported having upper belly cramping since late last night. I asked pt if she is having contractions. Pt stated she does not know how a contractions feel due to she has had 2 C/sections. I asked if she is feeling belly tightness pt stated she has been feeling that too, Pt wanted to know if she should head to the hospital or see is she can bee seen for and nst. I told pt I was going to put her on hold  to consult with one of our Doctors. I consulted with Dr. Mcgoevrn and Per Dr. Mcgovern Pt can be seen at the clinic ([facility]) for NST and also a visit with Sofie Stroud at 1:00 pm today. Pt was instructed that if for some reason she cannot make it at 1 pm today then she needs to go to L&D. I called [facility] office and spoke with Temitope PAR informed her the above and asked her to schedule pt for NST and visit with Sofie per Dr. Mcgovern instructions.   Pt was informed and instructed to be at the [facility] office at 1:00 pm. Pt verbalized understanding and will comply with instructions. Pt had no further questions.

## 2021-05-04 NOTE — PROGRESS NOTES
S:  Pt is  at 34w1d here for OB fit in. Missed her NST yesterday and is having decreased FM today. Also reports cramping and low back pain. No ED visits since last seen. Denies VB, LOF, RUCs, or vaginal DC.     O:  Please see above vitals.        FHTs: 150        Fundal ht: 35 cm.        S=D        Fetal position: vertex.     Reactive NST per my read: baseline 150, moderate variability, 2 accels in 20 min, no decels. NO UA    A:  IUP at 34w1d  Patient Active Problem List    Diagnosis Date Noted   • Obesity affecting pregnancy in third trimester 2021   • GDM, class A2 2021   • Cholestasis during pregnancy in third trimester 2021   • History of cholestasis during pregnancy 2021   • Umbilical hernia without obstruction and without gangrene 2021   • History of  section 2021   • Hashimoto's disease    • Pregnancy, supervision, high-risk, third trimester    • Asthma affecting pregnancy in second trimester        P:  1.  Labor precautions given.  Instructions given on where to go.  Pt receptive to              education.          2.  Questions answered.          4.  Discussed scheduled C/S at 37 wks for cholestasis        5.  Continue FKCs.          6.  Encouraged adequate water intake        7. NST today        8.  F/u on Thursday for NST and KEMI

## 2021-05-04 NOTE — PROGRESS NOTES
Pt. Here for OB/FU. Reports Good FM.   Good # [phone]  Pt. Denies VB, LOF, or UC's.   Pharmacy verified.   Chaperone offered and not indicated  Pt states that she has been having decreased fetal movement. Patient also states lower back pain, abdominal pain, and also states having darkened urine.

## 2021-05-06 ENCOUNTER — NON-PROVIDER VISIT (OUTPATIENT)
Dept: OBGYN | Facility: CLINIC | Age: 26
End: 2021-05-06
Payer: MEDICAID

## 2021-05-06 ENCOUNTER — ROUTINE PRENATAL (OUTPATIENT)
Dept: OBGYN | Facility: CLINIC | Age: 26
End: 2021-05-06
Payer: MEDICAID

## 2021-05-06 VITALS — BODY MASS INDEX: 37.11 KG/M2 | DIASTOLIC BLOOD PRESSURE: 63 MMHG | SYSTOLIC BLOOD PRESSURE: 105 MMHG | WEIGHT: 190 LBS

## 2021-05-06 DIAGNOSIS — O09.93 PREGNANCY, SUPERVISION, HIGH-RISK, THIRD TRIMESTER: ICD-10-CM

## 2021-05-06 DIAGNOSIS — O99.213 OBESITY AFFECTING PREGNANCY IN THIRD TRIMESTER: ICD-10-CM

## 2021-05-06 DIAGNOSIS — O24.419 GESTATIONAL DIABETES MELLITUS (GDM), ANTEPARTUM, GESTATIONAL DIABETES METHOD OF CONTROL UNSPECIFIED: ICD-10-CM

## 2021-05-06 DIAGNOSIS — O26.613 CHOLESTASIS DURING PREGNANCY IN THIRD TRIMESTER: ICD-10-CM

## 2021-05-06 DIAGNOSIS — K83.1 CHOLESTASIS DURING PREGNANCY IN THIRD TRIMESTER: ICD-10-CM

## 2021-05-06 DIAGNOSIS — O24.419 GDM, CLASS A2: ICD-10-CM

## 2021-05-06 DIAGNOSIS — Z98.891 HISTORY OF CESAREAN SECTION: ICD-10-CM

## 2021-05-06 LAB
NST ACOUSTIC STIMULATION: NO
NST ACTION NECESSARY: NORMAL
NST ASSESSMENT: NORMAL
NST BASELINE: NORMAL
NST INDICATIONS: NORMAL
NST OTHER DATA: NORMAL
NST READ BY: NORMAL
NST RETURN: NORMAL
NST UTERINE ACTIVITY: NO

## 2021-05-06 PROCEDURE — 90040 PR PRENATAL FOLLOW UP: CPT | Mod: 25 | Performed by: OBSTETRICS & GYNECOLOGY

## 2021-05-06 PROCEDURE — 59025 FETAL NON-STRESS TEST: CPT | Performed by: OBSTETRICS & GYNECOLOGY

## 2021-05-06 ASSESSMENT — FIBROSIS 4 INDEX: FIB4 SCORE: .5973191135873605405

## 2021-05-06 NOTE — PROGRESS NOTES
OB follow up - A2 GDM  +FM, Denies VB, LOF or UC's.  Phone # [phone]  Preferred pharmacy confirmed  POCT Glucose - 108 (pt had a protein shake at 0830)  Insulin refill sent to pharmacy

## 2021-05-06 NOTE — PROGRESS NOTES
S: Pt here for OB follow up. Doing well.   Reports normal fetal movements.    negative vaginal bleeding.    negative leakage of fluid.    negative contractions.    Reviewed blood sugar log with patient.  Reviewed diet.  Questions answered.    O: VSS Afeb      See prenatal vitals, chart for today's exam    FBS range: 78-90  1hr post prandial range: All within normal limits    Insulin regimen   NPH at bedtime 5U       A/P:  25 y.o.  34w3d with A2 GDM who presents for obstetric follow-up.  Patient is doing well and sizes equal to dates.  Diabetes is well controlled currently.  Patient has had 2 prior  and is scheduled for repeat  at 37 weeks.  NST is reactive today.  Patient was diagnosed with cholestasis and bile acids have improved with Actigall and patient is asymptomatic currently- repeat bile acid and urine analysis  Encounter Diagnoses   Name Primary?   • Gestational diabetes mellitus (GDM), antepartum, gestational diabetes method of control unspecified    • Pregnancy, supervision, high-risk, third trimester    • History of  section    • Cholestasis during pregnancy in third trimester    • Obesity affecting pregnancy in third trimester    • GDM, class A2        .    1.  Pregnancy precautions,  labor precautions and fetal kick counts educated  2.  Continue current insulin dosage  3.  NSTs: 2x/week to continue until delivery  4.  Continue prenatal vitamins.  5.  Watch weight gain.  Exercise at least 30 minutes daily  6.  Drink at least 2 liters of water daily.  7.  Encouraged prenatal classes.  8.  Follow-up in 1 week for obstetric follow-up.

## 2021-05-10 ENCOUNTER — HOSPITAL ENCOUNTER (OUTPATIENT)
Dept: LAB | Facility: MEDICAL CENTER | Age: 26
End: 2021-05-10
Attending: OBSTETRICS & GYNECOLOGY
Payer: MEDICAID

## 2021-05-10 ENCOUNTER — ROUTINE PRENATAL (OUTPATIENT)
Dept: OBGYN | Facility: CLINIC | Age: 26
End: 2021-05-10
Payer: MEDICAID

## 2021-05-10 DIAGNOSIS — O26.613 CHOLESTASIS DURING PREGNANCY IN THIRD TRIMESTER: ICD-10-CM

## 2021-05-10 DIAGNOSIS — O24.419 GESTATIONAL DIABETES MELLITUS (GDM), ANTEPARTUM, GESTATIONAL DIABETES METHOD OF CONTROL UNSPECIFIED: ICD-10-CM

## 2021-05-10 DIAGNOSIS — K83.1 CHOLESTASIS DURING PREGNANCY IN THIRD TRIMESTER: ICD-10-CM

## 2021-05-10 DIAGNOSIS — E06.3 HASHIMOTO'S THYROIDITIS: ICD-10-CM

## 2021-05-10 DIAGNOSIS — O24.419 GDM, CLASS A2: ICD-10-CM

## 2021-05-10 PROCEDURE — 36415 COLL VENOUS BLD VENIPUNCTURE: CPT

## 2021-05-10 PROCEDURE — 84439 ASSAY OF FREE THYROXINE: CPT

## 2021-05-10 PROCEDURE — 82239 BILE ACIDS TOTAL: CPT

## 2021-05-10 PROCEDURE — 86376 MICROSOMAL ANTIBODY EACH: CPT

## 2021-05-10 PROCEDURE — 84443 ASSAY THYROID STIM HORMONE: CPT

## 2021-05-10 PROCEDURE — 81001 URINALYSIS AUTO W/SCOPE: CPT

## 2021-05-10 PROCEDURE — 59025 FETAL NON-STRESS TEST: CPT | Performed by: OBSTETRICS & GYNECOLOGY

## 2021-05-10 NOTE — PROGRESS NOTES
Stephanie Farnsworth, a  at 35w0d with an ANDI of 2021, by Last Menstrual Period, was seen at [facility] for a nonstress test.    Nonstress Test  Reason for NST: Diabetes  Variability: Moderate  Decelerations: None  Accelerations: Yes  Acoustic Stimulator: No  Baseline: 140  Uterine Irritability: No  Contractions: Not present  Nonstress Test Interpretation  Comments: Reactive, Cat I

## 2021-05-11 LAB
AMORPH CRY #/AREA URNS HPF: PRESENT /HPF
APPEARANCE UR: ABNORMAL
BACTERIA #/AREA URNS HPF: NEGATIVE /HPF
BILIRUB UR QL STRIP.AUTO: NEGATIVE
COLOR UR: ABNORMAL
EPI CELLS #/AREA URNS HPF: NORMAL /HPF
GLUCOSE UR STRIP.AUTO-MCNC: NEGATIVE MG/DL
HYALINE CASTS #/AREA URNS LPF: NORMAL /LPF
KETONES UR STRIP.AUTO-MCNC: NEGATIVE MG/DL
LEUKOCYTE ESTERASE UR QL STRIP.AUTO: NEGATIVE
MICRO URNS: ABNORMAL
MUCOUS THREADS #/AREA URNS HPF: NORMAL /HPF
NITRITE UR QL STRIP.AUTO: NEGATIVE
PH UR STRIP.AUTO: 8 [PH] (ref 5–8)
PROT UR QL STRIP: NEGATIVE MG/DL
RBC # URNS HPF: NORMAL /HPF
RBC UR QL AUTO: NEGATIVE
SP GR UR STRIP.AUTO: 1.02
UROBILINOGEN UR STRIP.AUTO-MCNC: 1 MG/DL
WBC #/AREA URNS HPF: NORMAL /HPF

## 2021-05-12 ENCOUNTER — PRE-ADMISSION TESTING (OUTPATIENT)
Dept: ADMISSIONS | Facility: MEDICAL CENTER | Age: 26
End: 2021-05-12
Attending: OBSTETRICS & GYNECOLOGY
Payer: MEDICAID

## 2021-05-12 LAB
BILE AC SERPL-SCNC: 16 UMOL/L (ref 0–10)
THYROPEROXIDASE AB SERPL-ACNC: 25.8 IU/ML (ref 0–9)

## 2021-05-13 ENCOUNTER — ROUTINE PRENATAL (OUTPATIENT)
Dept: OBGYN | Facility: CLINIC | Age: 26
End: 2021-05-13
Payer: MEDICAID

## 2021-05-13 ENCOUNTER — NON-PROVIDER VISIT (OUTPATIENT)
Dept: OBGYN | Facility: CLINIC | Age: 26
End: 2021-05-13
Payer: MEDICAID

## 2021-05-13 VITALS — DIASTOLIC BLOOD PRESSURE: 66 MMHG | WEIGHT: 190 LBS | SYSTOLIC BLOOD PRESSURE: 111 MMHG | BODY MASS INDEX: 37.11 KG/M2

## 2021-05-13 DIAGNOSIS — K83.1 CHOLESTASIS DURING PREGNANCY IN THIRD TRIMESTER: ICD-10-CM

## 2021-05-13 DIAGNOSIS — O09.93 PREGNANCY, SUPERVISION, HIGH-RISK, THIRD TRIMESTER: ICD-10-CM

## 2021-05-13 DIAGNOSIS — O24.419 GDM, CLASS A2: ICD-10-CM

## 2021-05-13 DIAGNOSIS — O26.613 CHOLESTASIS DURING PREGNANCY IN THIRD TRIMESTER: ICD-10-CM

## 2021-05-13 DIAGNOSIS — O24.419 GESTATIONAL DIABETES MELLITUS (GDM), ANTEPARTUM, GESTATIONAL DIABETES METHOD OF CONTROL UNSPECIFIED: ICD-10-CM

## 2021-05-13 LAB — GLUCOSE BLD-MCNC: 103 MG/DL (ref 70–100)

## 2021-05-13 PROCEDURE — 90040 PR PRENATAL FOLLOW UP: CPT | Performed by: OBSTETRICS & GYNECOLOGY

## 2021-05-13 PROCEDURE — 82962 GLUCOSE BLOOD TEST: CPT | Performed by: OBSTETRICS & GYNECOLOGY

## 2021-05-13 RX ORDER — URSODIOL 300 MG/1
600 CAPSULE ORAL 3 TIMES DAILY
Qty: 90 CAPSULE | Refills: 6 | Status: ON HOLD | OUTPATIENT
Start: 2021-05-13 | End: 2021-05-27

## 2021-05-13 ASSESSMENT — FIBROSIS 4 INDEX: FIB4 SCORE: .5973191135873605405

## 2021-05-13 NOTE — PROGRESS NOTES
Chief complaint: Return OB visit    S: Pt here for OB follow up. Doing well.   positive fetal movement.    negative vaginal bleeding.    negative leakage of fluid.    negative contractions.    Reviewed blood sugar log with patient.  Reviewed diet.  Questions answered.    O: VSS Afeb      See prenatal vitals, chart for today's exam    FBS range: Elevated  1hr post prandial range: Within normal limits    Insulin regimen  NPH a.m. 0 , regular a.m. 0  Regular with dinner 0, NPH at bedtime 5       A/P:  25 y.o.  35w3d with A2 GDM who presents for obstetric follow-up.    1.  Labor precautions and fetal kick counts educated  2.  Change insulin dosage as follows:          AM: 0NPH, 0Reg, Before dinner: 0Reg, QHS: 10NPH  3.  NSTs: 2x/week to begin at 32 weeks.  4.  Continue prenatal vitamins.  5.  Watch weight gain.  Exercise at least 30 minutes daily  6.  Drink at least 2 liters of water daily.  7.  Encouraged prenatal classes.  8.  Follow-up in 1weeks for obstetric follow-up.    Last bile acid level elevated at 16.  We will increase Actigall at this time to 600 mg p.o. 3 times daily.  We will repeat bile acid levels next week, if they continue to increase, may need earlier delivery.  Has been scheduled for delivery at 37 weeks gestation on 2021    Increase insulin, monitor blood sugars.    Continue  testing

## 2021-05-13 NOTE — PROGRESS NOTES
OB f/u GDM. Good # [id number]   Good FM  Pt. Denies VB, LOF, or UC's.   Pharmacy verified.  Diabetic supplies none needed today.  BS in clinic : 103  Pt states last ate at 0800 eggs toast coffee

## 2021-05-15 LAB
T4 FREE SERPL-MCNC: 0.98 NG/DL (ref 0.93–1.7)
TSH SERPL DL<=0.005 MIU/L-ACNC: 2.83 UIU/ML (ref 0.38–5.33)

## 2021-05-17 ENCOUNTER — ROUTINE PRENATAL (OUTPATIENT)
Dept: OBGYN | Facility: CLINIC | Age: 26
End: 2021-05-17
Payer: MEDICAID

## 2021-05-17 ENCOUNTER — HOSPITAL ENCOUNTER (OUTPATIENT)
Facility: MEDICAL CENTER | Age: 26
End: 2021-05-17
Attending: OBSTETRICS & GYNECOLOGY | Admitting: OBSTETRICS & GYNECOLOGY
Payer: MEDICAID

## 2021-05-17 ENCOUNTER — APPOINTMENT (OUTPATIENT)
Dept: RADIOLOGY | Facility: MEDICAL CENTER | Age: 26
End: 2021-05-17
Attending: OBSTETRICS & GYNECOLOGY
Payer: MEDICAID

## 2021-05-17 VITALS
HEART RATE: 117 BPM | OXYGEN SATURATION: 96 % | SYSTOLIC BLOOD PRESSURE: 107 MMHG | WEIGHT: 190 LBS | BODY MASS INDEX: 37.3 KG/M2 | HEIGHT: 60 IN | RESPIRATION RATE: 16 BRPM | TEMPERATURE: 97.3 F | DIASTOLIC BLOOD PRESSURE: 72 MMHG

## 2021-05-17 DIAGNOSIS — O24.419 GESTATIONAL DIABETES MELLITUS (GDM), ANTEPARTUM, GESTATIONAL DIABETES METHOD OF CONTROL UNSPECIFIED: ICD-10-CM

## 2021-05-17 DIAGNOSIS — O26.613 CHOLESTASIS DURING PREGNANCY IN THIRD TRIMESTER: ICD-10-CM

## 2021-05-17 DIAGNOSIS — K83.1 CHOLESTASIS DURING PREGNANCY IN THIRD TRIMESTER: ICD-10-CM

## 2021-05-17 PROCEDURE — 59025 FETAL NON-STRESS TEST: CPT | Mod: XU

## 2021-05-17 PROCEDURE — 83789 MASS SPECTROMETRY QUAL/QUAN: CPT

## 2021-05-17 PROCEDURE — 76819 FETAL BIOPHYS PROFIL W/O NST: CPT

## 2021-05-17 PROCEDURE — 59025 FETAL NON-STRESS TEST: CPT | Performed by: OBSTETRICS & GYNECOLOGY

## 2021-05-17 PROCEDURE — 36415 COLL VENOUS BLD VENIPUNCTURE: CPT

## 2021-05-17 ASSESSMENT — FIBROSIS 4 INDEX: FIB4 SCORE: .5973191135873605405

## 2021-05-17 NOTE — DISCHARGE INSTRUCTIONS
Labor and Birth Information  Pregnancy normally lasts 39-41 weeks.  labor is when labor starts early. It starts before you have been pregnant for 37 whole weeks.  What are the risk factors for  labor?   labor is more likely to occur in women who:  · Have an infection while pregnant.  · Have a cervix that is short.  · Have gone into  labor before.  · Have had surgery on their cervix.  · Are younger than age 17.  · Are older than age 35.  · Are .  · Are pregnant with two or more babies.  · Take street drugs while pregnant.  · Smoke while pregnant.  · Do not gain enough weight while pregnant.  · Got pregnant right after another pregnancy.  What are the symptoms of  labor?  Symptoms of  labor include:  · Cramps. The cramps may feel like the cramps some women get during their period. The cramps may happen with watery poop (diarrhea).  · Pain in the belly (abdomen).  · Pain in the lower back.  · Regular contractions or tightening. It may feel like your belly is getting tighter.  · Pressure in the lower belly that seems to get stronger.  · More fluid (discharge) leaking from the vagina. The fluid may be watery or bloody.  · Water breaking.  Why is it important to notice signs of  labor?  Babies who are born early may not be fully developed. They have a higher chance for:  · Long-term heart problems.  · Long-term lung problems.  · Trouble controlling body systems, like breathing.  · Bleeding in the brain.  · A condition called cerebral palsy.  · Learning difficulties.  · Death.  These risks are highest for babies who are born before 34 weeks of pregnancy.  How is  labor treated?  Treatment depends on:  · How long you were pregnant.  · Your condition.  · The health of your baby.  Treatment may involve:  · Having a stitch (suture) placed in your cervix. When you give birth, your cervix opens so the baby can come out. The stitch keeps the cervix  from opening too soon.  · Staying at the hospital.  · Taking or getting medicines, such as:  ? Hormone medicines.  ? Medicines to stop contractions.  ? Medicines to help the baby’s lungs develop.  ? Medicines to prevent your baby from having cerebral palsy.  What should I do if I am in  labor?  If you think you are going into labor too soon, call your doctor right away.  How can I prevent  labor?  · Do not use any tobacco products.  ? Examples of these are cigarettes, chewing tobacco, and e-cigarettes.  ? If you need help quitting, ask your doctor.  · Do not use street drugs.  · Do not use any medicines unless you ask your doctor if they are safe for you.  · Talk with your doctor before taking any herbal supplements.  · Make sure you gain enough weight.  · Watch for infection. If you think you might have an infection, get it checked right away.  · If you have gone into  labor before, tell your doctor.  This information is not intended to replace advice given to you by your health care provider. Make sure you discuss any questions you have with your health care provider.  Document Released: 2010 Document Revised: 04/10/2020 Document Reviewed: 05/10/2017  [employer] Patient Education ©  [employer]  Cholestasis of Pregnancy    Cholestasis refers to any condition that causes the flow of digestive fluid (bile) produced by the liver to slow down or stop. Cholestasis of pregnancy is most common toward the end of pregnancy (thirdtrimester), but it can occur any time during pregnancy. The condition often goes away soon after giving birth.  Cholestasis may be uncomfortable, but it is usually harmless to you. However, it can be harmful to your baby. Cholestasis may increase the risk of:  · Your baby being born too early ( delivery).  · Your baby having a slow heart rate and lack of oxygen during delivery (fetal distress).  · Losing your baby before delivery (stillbirth).  What are the  causes?  The exact cause of this condition is not known, but it may be related to:  · Pregnancy hormones. The gallbladder normally holds bile until you need it to help digest fat in your diet. Pregnancy hormones may cause the flow of bile to slow down and back up into your liver. Bile may then get into your bloodstream and cause cholestasis symptoms.  · Changes in your genes (genetic mutations). Specifically, genes that affect how the liver releases bile.  What increases the risk?  You are more likely to develop this condition if:  · You had cholestasis during a previous pregnancy.  · You have a family history of cholestasis.  · You have liver problems.  · You are having multiple babies, such as twins or triplets.  What are the signs or symptoms?  The most common symptom of this condition is intense itching (pruritus), especially on the palms of your hands and soles of your feet. The itching can spread to the rest of your body and is often worse at night. You will not usually have a rash. Other symptoms may include:  · Feeling tired.  · Pain in your upper right abdomen.  · Dark-colored urine.  · Light-colored stools.  · Poor appetite.  · Yellowish discoloration of your skin and the whites of your eyes (jaundice).  How is this diagnosed?  This condition is diagnosed based on:  · Your medical history.  · A physical exam.  · Blood tests.  If you have an inherited risk for developing this condition, you may also have genetic testing.  How is this treated?  The goal of treatment is to make you comfortable and keep your baby safe. Your health care provider may:  · Prescribe medicine to reduce bile acid in your bloodstream, relieve symptoms, and help keep your baby safe.  · Give you vitamin K before delivery to prevent excessive bleeding.  · Check your baby frequently (fetal monitoring).  · Perform regular blood tests to check your bile levels and liver function until your baby is delivered.  · Recommend starting  (inducing) your labor and delivery by week 36 or 37 of pregnancy, or as soon as your baby's lungs have developed enough.  Follow these instructions at home:  · Take over-the-counter and prescription medicines only as told by your health care provider.  · Take cool baths to soothe itchy skin.  · Wear comfortable, loose-fitting, cotton clothing to reduce itching.  · Keep your fingernails short to prevent skin irritation from scratching.  · Keep all follow-up visits and prenatal visits as told by your health care provider. This is important.  Contact a health care provider if:  · Your symptoms get worse, even with treatment.  · You develop pain in your right side.  · You have unusual swelling in your abdomen, feet, ankles, or legs.  · You have a fever.  · You are more thirsty than usual.  Get help right away if:  · You go into early labor.  · You have a headache that does not go away or causes changes in vision.  · You have nausea or you vomit.  · You have severe pain in your abdomen or shoulders.  · You have shortness of breath.  Summary  · Cholestasis of pregnancy is most common toward the end of pregnancy (thirdtrimester), but it can occur any time during your pregnancy.  · The condition often goes away soon after your baby is born.  · The most common symptom of cholestasis of pregnancy is intense itching (pruritus), especially on the palms of your hands and soles of your feet.  · This condition may be treated with medicine, frequent monitoring, or starting (inducing) labor and delivery by week 36 or 37 of pregnancy.  This information is not intended to replace advice given to you by your health care provider. Make sure you discuss any questions you have with your health care provider.  Document Released: 12/15/2001 Document Revised: 04/09/2020 Document Reviewed: 12/02/2017  Elsevier Patient Education © 2020 [employer]  Fetal Movement Counts  Patient Name: ________________________________________________ Patient  Due Date: ____________________  What is a fetal movement count?    A fetal movement count is the number of times that you feel your baby move during a certain amount of time. This may also be called a fetal kick count. A fetal movement count is recommended for every pregnant woman. You may be asked to start counting fetal movements as early as week 28 of your pregnancy.  Pay attention to when your baby is most active. You may notice your baby's sleep and wake cycles. You may also notice things that make your baby move more. You should do a fetal movement count:  · When your baby is normally most active.  · At the same time each day.  A good time to count movements is while you are resting, after having something to eat and drink.  How do I count fetal movements?  1. Find a quiet, comfortable area. Sit, or lie down on your side.  2. Write down the date, the start time and stop time, and the number of movements that you felt between those two times. Take this information with you to your health care visits.  3. For 2 hours, count kicks, flutters, swishes, rolls, and jabs. You should feel at least 10 movements during 2 hours.  4. You may stop counting after you have felt 10 movements.  5. If you do not feel 10 movements in 2 hours, have something to eat and drink. Then, keep resting and counting for 1 hour. If you feel at least 4 movements during that hour, you may stop counting.  Contact a health care provider if:  · You feel fewer than 4 movements in 2 hours.  · Your baby is not moving like he or she usually does.  Date: ____________ Start time: ____________ Stop time: ____________ Movements: ____________  Date: ____________ Start time: ____________ Stop time: ____________ Movements: ____________  Date: ____________ Start time: ____________ Stop time: ____________ Movements: ____________  Date: ____________ Start time: ____________ Stop time: ____________ Movements: ____________  Date: ____________ Start time:  ____________ Stop time: ____________ Movements: ____________  Date: ____________ Start time: ____________ Stop time: ____________ Movements: ____________  Date: ____________ Start time: ____________ Stop time: ____________ Movements: ____________  Date: ____________ Start time: ____________ Stop time: ____________ Movements: ____________  Date: ____________ Start time: ____________ Stop time: ____________ Movements: ____________  This information is not intended to replace advice given to you by your health care provider. Make sure you discuss any questions you have with your health care provider.  Document Released: 01/17/2008 Document Revised: 01/07/2020 Document Reviewed: 01/26/2017  Elsevier Patient Education © 2020 [employer]

## 2021-05-17 NOTE — PROGRESS NOTES
25 y.o  @ 36.0 sent over from [facility] for BPP r/t fetal tachycardia. EFM 7 Robeline applied. 's moderate variability, accels present, no decels. BPP performed . Bile Salts drawn while here. REport to Dr. William Montemayor. Discharge order received. Discharge teaching performed. Pt verbalized understanding. Discharged to home ambulatory.

## 2021-05-20 ENCOUNTER — ROUTINE PRENATAL (OUTPATIENT)
Dept: OBGYN | Facility: CLINIC | Age: 26
End: 2021-05-20
Payer: MEDICAID

## 2021-05-20 ENCOUNTER — NON-PROVIDER VISIT (OUTPATIENT)
Dept: OBGYN | Facility: CLINIC | Age: 26
End: 2021-05-20
Payer: MEDICAID

## 2021-05-20 ENCOUNTER — HOSPITAL ENCOUNTER (OUTPATIENT)
Facility: MEDICAL CENTER | Age: 26
End: 2021-05-20
Attending: OBSTETRICS & GYNECOLOGY
Payer: MEDICAID

## 2021-05-20 VITALS — WEIGHT: 190 LBS | DIASTOLIC BLOOD PRESSURE: 66 MMHG | SYSTOLIC BLOOD PRESSURE: 114 MMHG | BODY MASS INDEX: 37.11 KG/M2

## 2021-05-20 DIAGNOSIS — O24.419 GESTATIONAL DIABETES MELLITUS (GDM), ANTEPARTUM, GESTATIONAL DIABETES METHOD OF CONTROL UNSPECIFIED: ICD-10-CM

## 2021-05-20 LAB
GLUCOSE BLD-MCNC: 86 MG/DL (ref 70–100)
NST ACOUSTIC STIMULATION: NORMAL
NST ACTION NECESSARY: NORMAL
NST ASSESSMENT: NORMAL
NST BASELINE: NORMAL
NST INDICATIONS: NORMAL
NST OTHER DATA: NORMAL
NST READ BY: NORMAL
NST RETURN: NORMAL
NST UTERINE ACTIVITY: NORMAL

## 2021-05-20 PROCEDURE — 87150 DNA/RNA AMPLIFIED PROBE: CPT

## 2021-05-20 PROCEDURE — 90040 PR PRENATAL FOLLOW UP: CPT | Performed by: OBSTETRICS & GYNECOLOGY

## 2021-05-20 PROCEDURE — 82962 GLUCOSE BLOOD TEST: CPT | Performed by: OBSTETRICS & GYNECOLOGY

## 2021-05-20 PROCEDURE — 87081 CULTURE SCREEN ONLY: CPT

## 2021-05-20 ASSESSMENT — FIBROSIS 4 INDEX: FIB4 SCORE: .5973191135873605405

## 2021-05-20 NOTE — PROGRESS NOTES
GDM Class  A2  . OB F/U.  + fetal movement  Denies any VB, LO or UC's  Phone # [phone]  Pharmacy confirmed  Diabetic supplies; no need  Patient did not bring book today  GBS today

## 2021-05-20 NOTE — PROGRESS NOTES
"The patient did not bring her glucose log today.  She reports her fasting glucoses are between 90 and 100.  Her 1 hour postprandials are between 105 and 125.  She is currently taking 10 units of NPH at at bedtime.  I have advised her to increase this to 14 units of NPH at at bedtime, as she states the majority of her fasting sugars are greater than 95.    NST is category 1 today.    She continues with itching, and is very upset that her bile acids drawn on 2021 are still in progress.  She states that she was told if the bile acids are greater than 40, she would be delivered earlier.  She is currently scheduled for a  section on 2021 at 930.  She previously no showed her preop appointment for this visit.  We discussed in detail that it is a balance between lung maturity with diabetic pregnancies and the cholestasis symptoms.  Fetal status is reassuring today.  I recommend proceeding with scheduled repeat  section as planned on 2021.  The patient is very upset with this.  I advised her that it is her right to seek second opinion if she is unhappy with this recommendation.  She states she will not seek second opinion at [facility] because \"they screwed up her previous pregnancy\".  I also offered her second opinion on labor and delivery today.  She declines.    Group B strep obtained.    Labor precautions discussed.  Follow-up 2021  is scheduled.  Patient advised to take her evening NPH as scheduled, and be n.p.o. after midnight day of surgery.  "

## 2021-05-21 ENCOUNTER — HOSPITAL ENCOUNTER (OUTPATIENT)
Dept: OBGYN | Facility: MEDICAL CENTER | Age: 26
End: 2021-05-21
Attending: OBSTETRICS & GYNECOLOGY
Payer: MEDICAID

## 2021-05-21 LAB
BILE AC SERPL-SCNC: 11.7 UMOL/L (ref 0–7)
CDCAE SERPL-SCNC: 1.3 UMOL/L (ref 0–3.4)
CHOLATE SERPL-SCNC: 1 UMOL/L (ref 0–1.9)
DO-CHOLATE SERPL-SCNC: 2 UMOL/L (ref 0–2.5)
URSODEOXYCHOLATE SERPL-SCNC: 7.4 UMOL/L (ref 0–1)

## 2021-05-21 PROCEDURE — U0005 INFEC AGEN DETEC AMPLI PROBE: HCPCS

## 2021-05-21 PROCEDURE — U0003 INFECTIOUS AGENT DETECTION BY NUCLEIC ACID (DNA OR RNA); SEVERE ACUTE RESPIRATORY SYNDROME CORONAVIRUS 2 (SARS-COV-2) (CORONAVIRUS DISEASE [COVID-19]), AMPLIFIED PROBE TECHNIQUE, MAKING USE OF HIGH THROUGHPUT TECHNOLOGIES AS DESCRIBED BY CMS-2020-01-R: HCPCS

## 2021-05-22 LAB
GP B STREP DNA SPEC QL NAA+PROBE: POSITIVE
SARS-COV-2 RNA RESP QL NAA+PROBE: NOTDETECTED
SPECIMEN SOURCE: NORMAL

## 2021-05-24 ENCOUNTER — ANESTHESIA (OUTPATIENT)
Dept: OBGYN | Facility: MEDICAL CENTER | Age: 26
End: 2021-05-24
Payer: MEDICAID

## 2021-05-24 ENCOUNTER — ANESTHESIA EVENT (OUTPATIENT)
Dept: OBGYN | Facility: MEDICAL CENTER | Age: 26
End: 2021-05-24
Payer: MEDICAID

## 2021-05-24 ENCOUNTER — HOSPITAL ENCOUNTER (INPATIENT)
Facility: MEDICAL CENTER | Age: 26
LOS: 3 days | End: 2021-05-27
Attending: OBSTETRICS & GYNECOLOGY | Admitting: OBSTETRICS & GYNECOLOGY
Payer: MEDICAID

## 2021-05-24 LAB
BASOPHILS # BLD AUTO: 0.4 % (ref 0–1.8)
BASOPHILS # BLD: 0.03 K/UL (ref 0–0.12)
EOSINOPHIL # BLD AUTO: 0.13 K/UL (ref 0–0.51)
EOSINOPHIL NFR BLD: 1.6 % (ref 0–6.9)
ERYTHROCYTE [DISTWIDTH] IN BLOOD BY AUTOMATED COUNT: 42 FL (ref 35.9–50)
ERYTHROCYTE [DISTWIDTH] IN BLOOD BY AUTOMATED COUNT: 42.1 FL (ref 35.9–50)
GLUCOSE BLD-MCNC: 79 MG/DL (ref 65–99)
HCT VFR BLD AUTO: 35.9 % (ref 37–47)
HCT VFR BLD AUTO: 40.6 % (ref 37–47)
HGB BLD-MCNC: 11.5 G/DL (ref 12–16)
HGB BLD-MCNC: 13.3 G/DL (ref 12–16)
HOLDING TUBE BB 8507: NORMAL
IMM GRANULOCYTES # BLD AUTO: 0.05 K/UL (ref 0–0.11)
IMM GRANULOCYTES NFR BLD AUTO: 0.6 % (ref 0–0.9)
LYMPHOCYTES # BLD AUTO: 2.27 K/UL (ref 1–4.8)
LYMPHOCYTES NFR BLD: 27.5 % (ref 22–41)
MCH RBC QN AUTO: 27.9 PG (ref 27–33)
MCH RBC QN AUTO: 28.1 PG (ref 27–33)
MCHC RBC AUTO-ENTMCNC: 32 G/DL (ref 33.6–35)
MCHC RBC AUTO-ENTMCNC: 32.8 G/DL (ref 33.6–35)
MCV RBC AUTO: 85.8 FL (ref 81.4–97.8)
MCV RBC AUTO: 87.1 FL (ref 81.4–97.8)
MONOCYTES # BLD AUTO: 0.63 K/UL (ref 0–0.85)
MONOCYTES NFR BLD AUTO: 7.6 % (ref 0–13.4)
NEUTROPHILS # BLD AUTO: 5.14 K/UL (ref 2–7.15)
NEUTROPHILS NFR BLD: 62.3 % (ref 44–72)
NRBC # BLD AUTO: 0 K/UL
NRBC BLD-RTO: 0 /100 WBC
PLATELET # BLD AUTO: 198 K/UL (ref 164–446)
PLATELET # BLD AUTO: 223 K/UL (ref 164–446)
PMV BLD AUTO: 10.7 FL (ref 9–12.9)
PMV BLD AUTO: 11 FL (ref 9–12.9)
RBC # BLD AUTO: 4.12 M/UL (ref 4.2–5.4)
RBC # BLD AUTO: 4.73 M/UL (ref 4.2–5.4)
WBC # BLD AUTO: 10.2 K/UL (ref 4.8–10.8)
WBC # BLD AUTO: 8.3 K/UL (ref 4.8–10.8)

## 2021-05-24 PROCEDURE — 160041 HCHG SURGERY MINUTES - EA ADDL 1 MIN LEVEL 4: Performed by: OBSTETRICS & GYNECOLOGY

## 2021-05-24 PROCEDURE — 700105 HCHG RX REV CODE 258: Performed by: ANESTHESIOLOGY

## 2021-05-24 PROCEDURE — 700111 HCHG RX REV CODE 636 W/ 250 OVERRIDE (IP)

## 2021-05-24 PROCEDURE — 160048 HCHG OR STATISTICAL LEVEL 1-5: Performed by: OBSTETRICS & GYNECOLOGY

## 2021-05-24 PROCEDURE — 59510 CESAREAN DELIVERY: CPT | Performed by: OBSTETRICS & GYNECOLOGY

## 2021-05-24 PROCEDURE — 36415 COLL VENOUS BLD VENIPUNCTURE: CPT

## 2021-05-24 PROCEDURE — 160029 HCHG SURGERY MINUTES - 1ST 30 MINS LEVEL 4: Performed by: OBSTETRICS & GYNECOLOGY

## 2021-05-24 PROCEDURE — 700111 HCHG RX REV CODE 636 W/ 250 OVERRIDE (IP): Performed by: ANESTHESIOLOGY

## 2021-05-24 PROCEDURE — 85025 COMPLETE CBC W/AUTO DIFF WBC: CPT

## 2021-05-24 PROCEDURE — 82962 GLUCOSE BLOOD TEST: CPT

## 2021-05-24 PROCEDURE — 59514 CESAREAN DELIVERY ONLY: CPT | Mod: 80 | Performed by: FAMILY MEDICINE

## 2021-05-24 PROCEDURE — 770002 HCHG ROOM/CARE - OB PRIVATE (112)

## 2021-05-24 PROCEDURE — 700101 HCHG RX REV CODE 250: Performed by: ANESTHESIOLOGY

## 2021-05-24 PROCEDURE — A9270 NON-COVERED ITEM OR SERVICE: HCPCS | Performed by: ANESTHESIOLOGY

## 2021-05-24 PROCEDURE — 700102 HCHG RX REV CODE 250 W/ 637 OVERRIDE(OP): Performed by: ANESTHESIOLOGY

## 2021-05-24 PROCEDURE — 160009 HCHG ANES TIME/MIN: Performed by: OBSTETRICS & GYNECOLOGY

## 2021-05-24 PROCEDURE — 85027 COMPLETE CBC AUTOMATED: CPT

## 2021-05-24 PROCEDURE — 160002 HCHG RECOVERY MINUTES (STAT): Performed by: OBSTETRICS & GYNECOLOGY

## 2021-05-24 PROCEDURE — 160035 HCHG PACU - 1ST 60 MINS PHASE I: Performed by: OBSTETRICS & GYNECOLOGY

## 2021-05-24 RX ORDER — ONDANSETRON 2 MG/ML
4 INJECTION INTRAMUSCULAR; INTRAVENOUS
Status: DISCONTINUED | OUTPATIENT
Start: 2021-05-24 | End: 2021-05-24 | Stop reason: HOSPADM

## 2021-05-24 RX ORDER — METHYLERGONOVINE MALEATE 0.2 MG/ML
0.2 INJECTION INTRAVENOUS
Status: DISCONTINUED | OUTPATIENT
Start: 2021-05-24 | End: 2021-05-27 | Stop reason: HOSPADM

## 2021-05-24 RX ORDER — ACETAMINOPHEN 500 MG
1000 TABLET ORAL EVERY 6 HOURS
Status: COMPLETED | OUTPATIENT
Start: 2021-05-24 | End: 2021-05-25

## 2021-05-24 RX ORDER — BUPIVACAINE HYDROCHLORIDE 7.5 MG/ML
INJECTION, SOLUTION INTRASPINAL PRN
Status: DISCONTINUED | OUTPATIENT
Start: 2021-05-24 | End: 2021-05-24 | Stop reason: SURG

## 2021-05-24 RX ORDER — MEPERIDINE HYDROCHLORIDE 25 MG/ML
12.5 INJECTION INTRAMUSCULAR; INTRAVENOUS; SUBCUTANEOUS
Status: DISCONTINUED | OUTPATIENT
Start: 2021-05-24 | End: 2021-05-24 | Stop reason: HOSPADM

## 2021-05-24 RX ORDER — HYDROMORPHONE HYDROCHLORIDE 1 MG/ML
0.2 INJECTION, SOLUTION INTRAMUSCULAR; INTRAVENOUS; SUBCUTANEOUS
Status: DISCONTINUED | OUTPATIENT
Start: 2021-05-24 | End: 2021-05-25

## 2021-05-24 RX ORDER — DIPHENHYDRAMINE HYDROCHLORIDE 50 MG/ML
12.5 INJECTION INTRAMUSCULAR; INTRAVENOUS
Status: DISCONTINUED | OUTPATIENT
Start: 2021-05-24 | End: 2021-05-24 | Stop reason: HOSPADM

## 2021-05-24 RX ORDER — SODIUM CHLORIDE, SODIUM LACTATE, POTASSIUM CHLORIDE, CALCIUM CHLORIDE 600; 310; 30; 20 MG/100ML; MG/100ML; MG/100ML; MG/100ML
INJECTION, SOLUTION INTRAVENOUS CONTINUOUS
Status: DISCONTINUED | OUTPATIENT
Start: 2021-05-24 | End: 2021-05-27 | Stop reason: HOSPADM

## 2021-05-24 RX ORDER — OXYCODONE HYDROCHLORIDE 5 MG/1
10 TABLET ORAL EVERY 4 HOURS PRN
Status: DISCONTINUED | OUTPATIENT
Start: 2021-05-24 | End: 2021-05-25

## 2021-05-24 RX ORDER — DIPHENHYDRAMINE HYDROCHLORIDE 50 MG/ML
12.5 INJECTION INTRAMUSCULAR; INTRAVENOUS EVERY 6 HOURS PRN
Status: DISCONTINUED | OUTPATIENT
Start: 2021-05-24 | End: 2021-05-25

## 2021-05-24 RX ORDER — OXYCODONE HYDROCHLORIDE 5 MG/1
5 TABLET ORAL EVERY 4 HOURS PRN
Status: DISCONTINUED | OUTPATIENT
Start: 2021-05-24 | End: 2021-05-25

## 2021-05-24 RX ORDER — SODIUM CHLORIDE, SODIUM LACTATE, POTASSIUM CHLORIDE, CALCIUM CHLORIDE 600; 310; 30; 20 MG/100ML; MG/100ML; MG/100ML; MG/100ML
INJECTION, SOLUTION INTRAVENOUS PRN
Status: DISCONTINUED | OUTPATIENT
Start: 2021-05-24 | End: 2021-05-27 | Stop reason: HOSPADM

## 2021-05-24 RX ORDER — KETOROLAC TROMETHAMINE 30 MG/ML
30 INJECTION, SOLUTION INTRAMUSCULAR; INTRAVENOUS EVERY 6 HOURS
Status: COMPLETED | OUTPATIENT
Start: 2021-05-24 | End: 2021-05-25

## 2021-05-24 RX ORDER — METOCLOPRAMIDE HYDROCHLORIDE 5 MG/ML
10 INJECTION INTRAMUSCULAR; INTRAVENOUS ONCE
Status: COMPLETED | OUTPATIENT
Start: 2021-05-24 | End: 2021-05-24

## 2021-05-24 RX ORDER — HYDROMORPHONE HYDROCHLORIDE 1 MG/ML
0.4 INJECTION, SOLUTION INTRAMUSCULAR; INTRAVENOUS; SUBCUTANEOUS
Status: DISCONTINUED | OUTPATIENT
Start: 2021-05-24 | End: 2021-05-25

## 2021-05-24 RX ORDER — CEFAZOLIN SODIUM 1 G/3ML
INJECTION, POWDER, FOR SOLUTION INTRAMUSCULAR; INTRAVENOUS PRN
Status: DISCONTINUED | OUTPATIENT
Start: 2021-05-24 | End: 2021-05-24 | Stop reason: SURG

## 2021-05-24 RX ORDER — OXYCODONE HCL 5 MG/5 ML
10 SOLUTION, ORAL ORAL
Status: COMPLETED | OUTPATIENT
Start: 2021-05-24 | End: 2021-05-24

## 2021-05-24 RX ORDER — ONDANSETRON 2 MG/ML
4 INJECTION INTRAMUSCULAR; INTRAVENOUS EVERY 6 HOURS PRN
Status: DISCONTINUED | OUTPATIENT
Start: 2021-05-24 | End: 2021-05-25

## 2021-05-24 RX ORDER — SCOLOPAMINE TRANSDERMAL SYSTEM 1 MG/1
1 PATCH, EXTENDED RELEASE TRANSDERMAL
Status: DISCONTINUED | OUTPATIENT
Start: 2021-05-24 | End: 2021-05-27 | Stop reason: HOSPADM

## 2021-05-24 RX ORDER — MIDAZOLAM HYDROCHLORIDE 1 MG/ML
1 INJECTION INTRAMUSCULAR; INTRAVENOUS
Status: DISCONTINUED | OUTPATIENT
Start: 2021-05-24 | End: 2021-05-24 | Stop reason: HOSPADM

## 2021-05-24 RX ORDER — KETOROLAC TROMETHAMINE 30 MG/ML
INJECTION, SOLUTION INTRAMUSCULAR; INTRAVENOUS PRN
Status: DISCONTINUED | OUTPATIENT
Start: 2021-05-24 | End: 2021-05-24 | Stop reason: SURG

## 2021-05-24 RX ORDER — MORPHINE SULFATE 0.5 MG/ML
INJECTION, SOLUTION EPIDURAL; INTRATHECAL; INTRAVENOUS PRN
Status: DISCONTINUED | OUTPATIENT
Start: 2021-05-24 | End: 2021-05-24 | Stop reason: SURG

## 2021-05-24 RX ORDER — DIPHENHYDRAMINE HYDROCHLORIDE 50 MG/ML
25 INJECTION INTRAMUSCULAR; INTRAVENOUS EVERY 6 HOURS PRN
Status: DISCONTINUED | OUTPATIENT
Start: 2021-05-24 | End: 2021-05-25

## 2021-05-24 RX ORDER — CITRIC ACID/SODIUM CITRATE 334-500MG
30 SOLUTION, ORAL ORAL ONCE
Status: COMPLETED | OUTPATIENT
Start: 2021-05-24 | End: 2021-05-24

## 2021-05-24 RX ORDER — MISOPROSTOL 200 UG/1
600 TABLET ORAL
Status: DISCONTINUED | OUTPATIENT
Start: 2021-05-24 | End: 2021-05-27 | Stop reason: HOSPADM

## 2021-05-24 RX ORDER — SODIUM CHLORIDE, SODIUM GLUCONATE, SODIUM ACETATE, POTASSIUM CHLORIDE AND MAGNESIUM CHLORIDE 526; 502; 368; 37; 30 MG/100ML; MG/100ML; MG/100ML; MG/100ML; MG/100ML
1500 INJECTION, SOLUTION INTRAVENOUS ONCE
Status: COMPLETED | OUTPATIENT
Start: 2021-05-24 | End: 2021-05-24

## 2021-05-24 RX ORDER — PHENYLEPHRINE HCL IN 0.9% NACL 0.5 MG/5ML
SYRINGE (ML) INTRAVENOUS PRN
Status: DISCONTINUED | OUTPATIENT
Start: 2021-05-24 | End: 2021-05-24 | Stop reason: SURG

## 2021-05-24 RX ORDER — OXYCODONE HCL 5 MG/5 ML
5 SOLUTION, ORAL ORAL
Status: COMPLETED | OUTPATIENT
Start: 2021-05-24 | End: 2021-05-24

## 2021-05-24 RX ORDER — SODIUM CHLORIDE, SODIUM GLUCONATE, SODIUM ACETATE, POTASSIUM CHLORIDE AND MAGNESIUM CHLORIDE 526; 502; 368; 37; 30 MG/100ML; MG/100ML; MG/100ML; MG/100ML; MG/100ML
INJECTION, SOLUTION INTRAVENOUS
Status: DISCONTINUED | OUTPATIENT
Start: 2021-05-24 | End: 2021-05-24 | Stop reason: SURG

## 2021-05-24 RX ORDER — OXYTOCIN 10 [USP'U]/ML
INJECTION, SOLUTION INTRAMUSCULAR; INTRAVENOUS PRN
Status: DISCONTINUED | OUTPATIENT
Start: 2021-05-24 | End: 2021-05-24 | Stop reason: SURG

## 2021-05-24 RX ORDER — DOCUSATE SODIUM 100 MG/1
100 CAPSULE, LIQUID FILLED ORAL 2 TIMES DAILY PRN
Status: DISCONTINUED | OUTPATIENT
Start: 2021-05-24 | End: 2021-05-27 | Stop reason: HOSPADM

## 2021-05-24 RX ORDER — HALOPERIDOL 5 MG/ML
1 INJECTION INTRAMUSCULAR
Status: DISCONTINUED | OUTPATIENT
Start: 2021-05-24 | End: 2021-05-24 | Stop reason: HOSPADM

## 2021-05-24 RX ADMIN — ONDANSETRON 4 MG: 2 INJECTION INTRAMUSCULAR; INTRAVENOUS at 17:16

## 2021-05-24 RX ADMIN — MORPHINE SULFATE 0.15 MG: 0.5 INJECTION, SOLUTION EPIDURAL; INTRATHECAL; INTRAVENOUS at 12:54

## 2021-05-24 RX ADMIN — METOCLOPRAMIDE 10 MG: 5 INJECTION, SOLUTION INTRAMUSCULAR; INTRAVENOUS at 12:30

## 2021-05-24 RX ADMIN — OXYCODONE HYDROCHLORIDE 10 MG: 5 SOLUTION ORAL at 14:04

## 2021-05-24 RX ADMIN — FAMOTIDINE 20 MG: 10 INJECTION INTRAVENOUS at 12:30

## 2021-05-24 RX ADMIN — EPHEDRINE SULFATE 10 MG: 50 INJECTION, SOLUTION INTRAVENOUS at 13:00

## 2021-05-24 RX ADMIN — ACETAMINOPHEN 1000 MG: 500 TABLET ORAL at 16:18

## 2021-05-24 RX ADMIN — SODIUM CHLORIDE, SODIUM GLUCONATE, SODIUM ACETATE, POTASSIUM CHLORIDE AND MAGNESIUM CHLORIDE: 526; 502; 368; 37; 30 INJECTION, SOLUTION INTRAVENOUS at 12:45

## 2021-05-24 RX ADMIN — Medication 100 MCG: at 13:03

## 2021-05-24 RX ADMIN — KETOROLAC TROMETHAMINE 30 MG: 30 INJECTION, SOLUTION INTRAMUSCULAR; INTRAVENOUS at 20:05

## 2021-05-24 RX ADMIN — KETOROLAC TROMETHAMINE 30 MG: 30 INJECTION, SOLUTION INTRAMUSCULAR at 13:45

## 2021-05-24 RX ADMIN — CEFAZOLIN 2.6 G: 330 INJECTION, POWDER, FOR SOLUTION INTRAMUSCULAR; INTRAVENOUS at 12:56

## 2021-05-24 RX ADMIN — ACETAMINOPHEN 1000 MG: 500 TABLET ORAL at 22:04

## 2021-05-24 RX ADMIN — BUPIVACAINE HYDROCHLORIDE IN DEXTROSE 12 MG: 7.5 INJECTION, SOLUTION SUBARACHNOID at 12:54

## 2021-05-24 RX ADMIN — SODIUM CITRATE AND CITRIC ACID MONOHYDRATE 30 ML: 500; 334 SOLUTION ORAL at 12:30

## 2021-05-24 RX ADMIN — SODIUM CHLORIDE, SODIUM GLUCONATE, SODIUM ACETATE, POTASSIUM CHLORIDE AND MAGNESIUM CHLORIDE 1500 ML: 526; 502; 368; 37; 30 INJECTION, SOLUTION INTRAVENOUS at 12:28

## 2021-05-24 RX ADMIN — EPHEDRINE SULFATE 10 MG: 50 INJECTION, SOLUTION INTRAVENOUS at 13:07

## 2021-05-24 RX ADMIN — OXYTOCIN 20 UNITS: 10 INJECTION, SOLUTION INTRAMUSCULAR; INTRAVENOUS at 14:05

## 2021-05-24 RX ADMIN — OXYTOCIN 20 UNITS: 10 INJECTION, SOLUTION INTRAMUSCULAR; INTRAVENOUS at 13:16

## 2021-05-24 ASSESSMENT — PAIN SCALES - GENERAL
PAIN_LEVEL: 0
PAINLEVEL: 0 - NO PAIN

## 2021-05-24 ASSESSMENT — EDINBURGH POSTNATAL DEPRESSION SCALE (EPDS)
I HAVE BEEN ABLE TO LAUGH AND SEE THE FUNNY SIDE OF THINGS: AS MUCH AS I ALWAYS COULD
I HAVE FELT SCARED OR PANICKY FOR NO GOOD REASON: NO, NOT AT ALL
I HAVE BEEN SO UNHAPPY THAT I HAVE HAD DIFFICULTY SLEEPING: NOT AT ALL
I HAVE BEEN ANXIOUS OR WORRIED FOR NO GOOD REASON: HARDLY EVER
I HAVE FELT SAD OR MISERABLE: NO, NOT AT ALL
I HAVE BLAMED MYSELF UNNECESSARILY WHEN THINGS WENT WRONG: NOT VERY OFTEN
THE THOUGHT OF HARMING MYSELF HAS OCCURRED TO ME: NEVER
I HAVE LOOKED FORWARD WITH ENJOYMENT TO THINGS: AS MUCH AS I EVER DID
I HAVE BEEN SO UNHAPPY THAT I HAVE BEEN CRYING: NO, NEVER
THINGS HAVE BEEN GETTING ON TOP OF ME: NO, MOST OF THE TIME I HAVE COPED QUITE WELL

## 2021-05-24 ASSESSMENT — PATIENT HEALTH QUESTIONNAIRE - PHQ9
1. LITTLE INTEREST OR PLEASURE IN DOING THINGS: NOT AT ALL
SUM OF ALL RESPONSES TO PHQ9 QUESTIONS 1 AND 2: 0
2. FEELING DOWN, DEPRESSED, IRRITABLE, OR HOPELESS: NOT AT ALL

## 2021-05-24 ASSESSMENT — PAIN DESCRIPTION - PAIN TYPE
TYPE: SURGICAL PAIN
TYPE: SURGICAL PAIN

## 2021-05-24 ASSESSMENT — FIBROSIS 4 INDEX: FIB4 SCORE: .5973191135873605405

## 2021-05-24 ASSESSMENT — LIFESTYLE VARIABLES: EVER_SMOKED: NEVER

## 2021-05-24 NOTE — PROGRESS NOTES
Report received from MARGARETH Matias RN in PACU. Pt VSS. Medicated per MAR.     1515- Pt transferred to PP unit with infant. Bedside report given to PP ABIOLA Rodriguez. POC discussed.

## 2021-05-24 NOTE — ANESTHESIA POSTPROCEDURE EVALUATION
Patient: Stephanie Farnsworth    Procedure Summary     Date: 21 Room / Location: [facility] OR 02 / SURGERY LABOR AND DELIVERY    Anesthesia Start: 1245 Anesthesia Stop: 1355    Procedure:  SECTION, REPEAT (Bilateral Abdomen) Diagnosis: (IUP 37w Repeat  SECTION, CHOLESTASIS, GDM; delivered)    Surgeons: Riccardo Palacios M.D. Responsible Provider: Endy Cuadra M.D.    Anesthesia Type: spinal ASA Status: 2          Final Anesthesia Type: spinal  Last vitals  BP   Blood Pressure: 111/71    Temp   36.7 °C (98 °F)    Pulse   82   Resp   16    SpO2   95 %      Anesthesia Post Evaluation    Patient location during evaluation: floor  Patient participation: complete - patient participated  Level of consciousness: awake and alert  Pain score: 0    Airway patency: patent  Anesthetic complications: no  Cardiovascular status: hemodynamically stable  Respiratory status: acceptable  Hydration status: euvolemic    PONV: none          No complications documented.     Nurse Pain Score: 1 (NPRS)

## 2021-05-24 NOTE — OP REPORT
DATE OF SERVICE:  2021     PREOPERATIVE DIAGNOSES:  Intrauterine pregnancy at 37 and 0/7 weeks with   cholestasis of pregnancy and class A2 gestational diabetes as well as 2   previous  sections. .     POSTOPERATIVE DIAGNOSES:  Intrauterine pregnancy at 37 and 0/7 weeks with   cholestasis of pregnancy and class A2 gestational diabetes as well as 2   previous  sections.     SURGEON:  Riccardo Palacios MD     ASSISTANT:  William Montemayor MD     PROCEDURE:  Repeat low transverse uterine  section.     COMPLICATIONS:  None.     DRAINS:  Crump catheter.     ANESTHESIA:  Spinal.     ANESTHESIOLOGIST:  Endy Cuadra MD     ESTIMATED BLOOD LOSS:  600 mL     INDICATIONS:  This patient is a 25-year-old white female  5, para   2-0-2-2, at 37 and 0/7 weeks with a history of a previous  section x2.    The patient has cholestasis of pregnancy and class A2 gestational diabetes.     FINDINGS:  Cephalic presentation, clear amniotic fluid, loose nuchal cord x1.    Adhesions from the bladder to the anterior portion of the uterus slightly   thin and attenuated lower uterine segment.  Normal fallopian tubes, normal   ovaries.  Apgar scores 8 and 9.     DESCRIPTION OF PROCEDURE:  After adequately being counseled, the patient was   taken to the operating room and placed in supine position.  A spinal   anesthetic was placed.  Fetal heart tones noted to be normal before and after   placement of spinal.  She was prepped and draped in the usual sterile fashion.    Pfannenstiel skin incision made over the previous one, taken down to the   fascia.  The fascia was incised with scalpel and the fascial incision taken   laterally on both sides with Huerta scissors.  Rectus fascia dissected off the   underlying rectus muscles both superiorly and inferiorly.  Rectus muscles were   split in the midline.  Peritoneal cavity was entered by elevating the   peritoneum using hemostats and incising with Metzenbaum  scissors.  Peritoneal   incision taken superiorly and inferiorly.  Bladder blade placed in the   bladder.  Next, bladder flap was developed sharply and a bladder blade   replaced over the bladder.  Next, a low transverse uterine incision was made   with a scalpel.  The infant was delivered, bulb suctioned.  Umbilical cord   clamped and cut and the infant handed off to pediatrics.  Placenta was removed   from the uterus.  Uterine cavity was cleansed with a moist laparotomy sponge.    Uterus was exteriorized and uterine incision was closed in two layers using   0 Vicryl in a running locking fashion.  Second layer was an imbricating layer.    Good hemostasis noted.  Uterus returned to the abdominal pelvic cavity.    Pelvis was irrigated and suctioned.  Hemostasis confirmed in the hysterotomy   site.  Next, the peritoneal lining was closed using running nonlocked stitch   of 0 Vicryl.  Rectus muscles were reapproximated using interrupted stitch of 0   chromic and the rectus fascia closed using running nonlocked stitch of 0   Vicryl.  Subcutaneous tissues were irrigated and suctioned and electrocautery   used for hemostasis.  Several subcuticular stitches of 0 Vicryl was used to   reapproximate subcutaneous tissues.  Skin was closed using surgical staples   and a pressure dressing was applied.  The patient was taken to recovery room   in good condition.  No complications noted.        ______________________________  MD SHAHRZAD AC/YUMIKO    DD:  05/24/2021 14:26  DT:  05/24/2021 15:21    Job#:  [id number]

## 2021-05-24 NOTE — H&P
History and Physical;      Stephanie Farnsworth is a 25 y.o. year old female  at 37w0d who presents for repeat  section.  Patient has a history of cholestasis of pregnancy and class A-II gestational diabetes-well-controlled    Subjective:   negative  For CTXS.   negative Feels pain   negative for LOF  negative for vaginal bleeding.   positive for fetal movement    ROS: A comprehensive review of systems was negative.    Past Medical History:   Diagnosis Date   • GDM, class A2 2021   • Hashimoto's disease    • Hashimoto's disease 2019   • Infertility, female    • Miscarriage 2015    twice   • Unspecified asthma(493.90)      Past Surgical History:   Procedure Laterality Date   • PRIMARY C SECTION  2016    Procedure: PRIMARY C SECTION;  Surgeon: Wilma Vásquez M.D.;  Location: LABOR AND DELIVERY;  Service:    • APPENDECTOMY  2015   • ANEESH BY LAPAROSCOPY  2014    Performed by Arnoldo Sarabia M.D. at SURGERY [facility]   • ERCP IN OR  2014    Performed by Dilan Farrell M.D. at SURGERY [facility] ORS   • PRIMARY C SECTION       OB History    Para Term  AB Living   5 2 2 0 2 2   SAB TAB Ectopic Molar Multiple Live Births   2 0 0     2      # Outcome Date GA Lbr Bradley/2nd Weight Sex Delivery Anes PTL Lv   5 Current            4 SAB 19           3 Term 17 38w0d  3.09 kg (6 lb 13 oz) F CS-LTranv   CARLITA      Complications: Cholestasis during pregnancy in third trimester   2 Term 16 37w1d  3.685 kg (8 lb 2 oz) M CS-Unspec  N CARLITA      Complications: Cholestasis during pregnancy in third trimester, Failed induction of labor   1 SAB 04/29/15 6w0d            Social History     Socioeconomic History   • Marital status:      Spouse name: Not on file   • Number of children: Not on file   • Years of education: Not on file   • Highest education level: Not on file   Occupational History   • Not on file   Tobacco Use   • Smoking status: Former  Smoker     Packs/day: 0.10     Years: 1.00     Pack years: 0.10     Types: Cigarettes     Quit date: 10/9/2012     Years since quittin.6   • Smokeless tobacco: Never Used   Vaping Use   • Vaping Use: Never used   Substance and Sexual Activity   • Alcohol use: Not Currently     Comment: Occadional beer, not during the pregnancy   • Drug use: No   • Sexual activity: Yes     Partners: Male   Other Topics Concern   • Not on file   Social History Narrative    ** Merged History Encounter **          Social Determinants of Health     Financial Resource Strain:    • Difficulty of Paying Living Expenses:    Food Insecurity:    • Worried About Running Out of Food in the Last Year:    • Ran Out of Food in the Last Year:    Transportation Needs:    • Lack of Transportation (Medical):    • Lack of Transportation (Non-Medical):    Physical Activity:    • Days of Exercise per Week:    • Minutes of Exercise per Session:    Stress:    • Feeling of Stress :    Social Connections:    • Frequency of Communication with Friends and Family:    • Frequency of Social Gatherings with Friends and Family:    • Attends L[employer] Services:    • Active Member of Clubs or Organizations:    • Attends Club or Organization Meetings:    • Marital Status:    Intimate Partner Violence:    • Fear of Current or Ex-Partner:    • Emotionally Abused:    • Physically Abused:    • Sexually Abused:      Allergies: Patient has no known allergies.    Current Facility-Administered Medications:   •  electrolyte-A (PLASMALYTE-A) infusion 1,500 mL, 1,500 mL, Intravenous, Once, Endy Cuadra M.D.  •  Na citrate-citric acid (BICITRA) 500-334 MG/5ML solution 30 mL, 30 mL, Oral, Once, Endy Cuadra M.D.  •  famotidine (PEPCID) injection 20 mg, 20 mg, Intravenous, Once, Endy Cuadra M.D.  •  metoclopramide (REGLAN) injection 10 mg, 10 mg, Intravenous, Once, Endy Cuadra M.D.  •  oxytocin (PITOCIN) 20 UNITS/1000ML LR, , , ,     Prenatal care with [facility] with  the following problems:  Patient Active Problem List    Diagnosis Date Noted   • Obesity affecting pregnancy in third trimester 2021   • GDM, class A2 2021   • Cholestasis during pregnancy in third trimester 2021   • History of cholestasis during pregnancy 2021   • Umbilical hernia without obstruction and without gangrene 2021   • History of  section 2021   • Hashimoto's disease    • Pregnancy, supervision, high-risk, third trimester    • Asthma affecting pregnancy in second trimester          Objective:      /71   Pulse 82   Temp 36.7 °C (98 °F)   Resp 16   Ht 1.524 m (5')   Wt 86.2 kg (190 lb)   SpO2 95%     General:   no acute distress   Skin:   normal   HEENT:  PERRLA   Lungs:   CTA bilateral   Heart:   brisk carotid upstroke without bruits, peripheral pulses very brisk, chest is clear without rales or wheezing, no pedal edema, no JVD, no hepatosplenomegaly   Abdomen:   gravid, NT   EFW:  3400 g   Pelvis:  Vulva and vagina appear normal. Bimanual exam reveals normal uterus and adnexa., proven to 0 g   FHTs: 145 BPM good accels no decels good variability   Contractions: 0 contractions in 10 min soft to palpation   Uterine Size: S=D   Presentations: Cephalic per RN   Cervix: Per RN    Dilation: 1cm    Effacement: 50%    Station:  -2    Consistency: Medium    Position: Middle     Complete OB US  See labs    Lab Review  Lab:   Blood type: O     Recent Results (from the past 5880 hour(s))   POCT Urinalysis    Collection Time: 21 10:48 AM   Result Value Ref Range    POC Color      POC Appearance      POC Leukocyte Esterase negative Negative    POC Nitrites negative Negative    POC Urobiligen      POC Protein negative Negative mg/dL    POC Urine PH 7.0 5.0 - 8.0    POC Blood negative Negative    POC Specific Gravity 1.015 <1.005 - >1.030    POC Ketones negative Negative mg/dL    POC Bilirubin      POC Glucose negative Negative mg/dL   PREG CNTR PRENATAL PN     Collection Time: 02/05/21 11:22 AM   Result Value Ref Range    Color Yellow     Character Clear     Specific Gravity 1.006 <1.035    Ph 7.0 5.0 - 8.0    Glucose Negative Negative mg/dL    Ketones Negative Negative mg/dL    Protein Negative Negative mg/dL    Bilirubin Negative Negative    Urobilinogen, Urine 0.2 Negative    Nitrite Negative Negative    Leukocyte Esterase Negative Negative    Occult Blood Negative Negative    Micro Urine Req see below     WBC 8.4 4.8 - 10.8 K/uL    RBC 4.40 4.20 - 5.40 M/uL    Hemoglobin 13.1 12.0 - 16.0 g/dL    Hematocrit 39.4 37.0 - 47.0 %    MCV 89.5 81.4 - 97.8 fL    MCH 29.8 27.0 - 33.0 pg    MCHC 33.2 (L) 33.6 - 35.0 g/dL    RDW 44.0 35.9 - 50.0 fL    Platelet Count 243 164 - 446 K/uL    MPV 11.5 9.0 - 12.9 fL    Neutrophils-Polys 70.50 44.00 - 72.00 %    Lymphocytes 21.20 (L) 22.00 - 41.00 %    Monocytes 6.30 0.00 - 13.40 %    Eosinophils 1.00 0.00 - 6.90 %    Basophils 0.40 0.00 - 1.80 %    Immature Granulocytes 0.60 0.00 - 0.90 %    Nucleated RBC 0.00 /100 WBC    Neutrophils (Absolute) 5.91 2.00 - 7.15 K/uL    Lymphs (Absolute) 1.78 1.00 - 4.80 K/uL    Monos (Absolute) 0.53 0.00 - 0.85 K/uL    Eos (Absolute) 0.08 0.00 - 0.51 K/uL    Baso (Absolute) 0.03 0.00 - 0.12 K/uL    Immature Granulocytes (abs) 0.05 0.00 - 0.11 K/uL    NRBC (Absolute) 0.00 K/uL    Hepatitis B Surface Antigen Non-Reactive Non-Reactive    Rubella IgG Antibody 108.00 IU/mL    Syphilis, Treponemal Qual Non-Reactive Non-Reactive   URINE DRUG SCREEN W/CONF (AR)    Collection Time: 02/05/21 11:22 AM   Result Value Ref Range    Urine Amphetamine-Methamphetam Negative Cutoff 300 ng/mL    Barbiturates Negative Cutoff 200 ng/mL    Benzodiazepines Negative Cutoff 200 ng/mL    Propoxyphene Negative Cutoff 300 ng/mL    Cocaine Metabolite Negative Cutoff 150 ng/mL    Methadone Negative Cutoff 150 ng/mL    Codeine-Morphine Negative Cutoff 300 ng/mL    Phencyclidine -Pcp Negative Cutoff 25 ng/mL    Cannabinoid Metab  Negative Cutoff 20 ng/mL    Drug Comment Urine Drugs See Note    Chlamydia/GC PCR Urine Or Swab    Collection Time: 02/05/21 11:22 AM    Specimen: Urine, First Catch   Result Value Ref Range    C. trachomatis by PCR Negative Negative    N. gonorrhoeae by PCR Negative Negative    Source Urine    HIV AG/AB COMBO ASSAY SCREENING    Collection Time: 02/05/21 11:22 AM   Result Value Ref Range    HIV Ag/Ab Combo Assay Non-Reactive Non Reactive   OP Prenatal Panel-Blood Bank    Collection Time: 02/05/21 11:22 AM   Result Value Ref Range    ABO Grouping Only O     Rh Grouping Only POS     Antibody Screen Scrn NEG    HEP C VIRUS ANTIBODY    Collection Time: 02/05/21 11:23 AM   Result Value Ref Range    Hepatitis C Antibody Non-Reactive Non-Reactive   BILE ACIDS, SERUM    Collection Time: 02/05/21 11:23 AM   Result Value Ref Range    Bile Acids Serum 10 0 - 10 umol/L   BILE ACIDS, SERUM    Collection Time: 03/10/21 10:23 AM   Result Value Ref Range    Bile Acids Serum 15 (H) 0 - 10 umol/L   Comp Metabolic Panel    Collection Time: 03/10/21 10:23 AM   Result Value Ref Range    Sodium 138 135 - 145 mmol/L    Potassium 4.1 3.6 - 5.5 mmol/L    Chloride 104 96 - 112 mmol/L    Co2 24 20 - 33 mmol/L    Anion Gap 10.0 7.0 - 16.0    Glucose 85 65 - 99 mg/dL    Bun 5 (L) 8 - 22 mg/dL    Creatinine 0.48 (L) 0.50 - 1.40 mg/dL    Calcium 9.3 8.5 - 10.5 mg/dL    AST(SGOT) 16 12 - 45 U/L    ALT(SGPT) 14 2 - 50 U/L    Alkaline Phosphatase 107 (H) 30 - 99 U/L    Total Bilirubin 0.2 0.1 - 1.5 mg/dL    Albumin 3.9 3.2 - 4.9 g/dL    Total Protein 6.9 6.0 - 8.2 g/dL    Globulin 3.0 1.9 - 3.5 g/dL    A-G Ratio 1.3 g/dL   FASTING STATUS    Collection Time: 03/10/21 10:23 AM   Result Value Ref Range    Fasting Status Fasting    ESTIMATED GFR    Collection Time: 03/10/21 10:23 AM   Result Value Ref Range    GFR If African American >60 >60 mL/min/1.73 m 2    GFR If Non African American >60 >60 mL/min/1.73 m 2   POCT Fetal Nonstress Test    Collection  Time: 04/20/21  9:32 AM   Result Value Ref Range    NST Indications Cholestasis of pregnancy     NST Baseline 130s     NST Uterine Activity 1 contraction     NST Acoustic Stimulation      NST Assessment Cat 1     NST Action Necessary      NST Other Data      NST Return twice weekly     NST Read By Mike    BILE ACIDS, SERUM    Collection Time: 04/20/21 10:49 AM   Result Value Ref Range    Bile Acids Serum 5 0 - 10 umol/L   HEMOGLOBIN A1C    Collection Time: 04/20/21 10:49 AM   Result Value Ref Range    Glycohemoglobin 4.7 4.0 - 5.6 %    Est Avg Glucose 88 mg/dL   Comp Metabolic Panel    Collection Time: 04/20/21 10:49 AM   Result Value Ref Range    Sodium 132 (L) 135 - 145 mmol/L    Potassium 4.5 3.6 - 5.5 mmol/L    Chloride 99 96 - 112 mmol/L    Co2 25 20 - 33 mmol/L    Anion Gap 8.0 7.0 - 16.0    Glucose 89 65 - 99 mg/dL    Bun 6 (L) 8 - 22 mg/dL    Creatinine 0.39 (L) 0.50 - 1.40 mg/dL    Calcium 9.4 8.5 - 10.5 mg/dL    AST(SGOT) 17 12 - 45 U/L    ALT(SGPT) 10 2 - 50 U/L    Alkaline Phosphatase 136 (H) 30 - 99 U/L    Total Bilirubin 0.2 0.1 - 1.5 mg/dL    Albumin 3.7 3.2 - 4.9 g/dL    Total Protein 6.8 6.0 - 8.2 g/dL    Globulin 3.1 1.9 - 3.5 g/dL    A-G Ratio 1.2 g/dL   ESTIMATED GFR    Collection Time: 04/20/21 10:49 AM   Result Value Ref Range    GFR If African American >60 >60 mL/min/1.73 m 2    GFR If Non African American >60 >60 mL/min/1.73 m 2   POCT Fetal Nonstress Test    Collection Time: 04/23/21  1:14 PM   Result Value Ref Range    NST Indications      NST Baseline      NST Uterine Activity      NST Acoustic Stimulation      NST Assessment      NST Action Necessary      NST Other Data      NST Return      NST Read By     POCT Glucose    Collection Time: 04/23/21  1:30 PM   Result Value Ref Range    Glucose - Accu-Ck 101 (A) 70 - 100 mg/dL   POCT Fetal Nonstress Test    Collection Time: 04/26/21  8:05 AM   Result Value Ref Range    NST Indications      NST Baseline      NST Uterine Activity      NST  Acoustic Stimulation      NST Assessment      NST Action Necessary      NST Other Data      NST Return      NST Read By     POCT Fetal Nonstress Test    Collection Time: 04/29/21  1:06 PM   Result Value Ref Range    NST Indications GDM A2     NST Baseline 140s     NST Uterine Activity Few irregular contractions noted     NST Acoustic Stimulation No     NST Assessment Reactive NST without decelerations     NST Action Necessary      NST Other Data      NST Return      NST Read By CURT RAMÍREZ [[id number]]    POCT Glucose    Collection Time: 04/29/21  1:24 PM   Result Value Ref Range    Glucose - Accu-Ck 86 70 - 100 mg/dL   POCT Fetal Nonstress Test    Collection Time: 05/06/21 10:28 AM   Result Value Ref Range    NST Indications GDM A2     NST Baseline 140s     NST Uterine Activity no     NST Acoustic Stimulation no     NST Assessment Reactive NST without decelerations     NST Action Necessary      NST Other Data      NST Return      NST Read By CURT RAMÍREZ [[id number]]    POCT Fetal Nonstress Test    Collection Time: 05/10/21  9:53 AM   Result Value Ref Range    NST Indications      NST Baseline      NST Uterine Activity      NST Acoustic Stimulation      NST Assessment      NST Action Necessary      NST Other Data      NST Return      NST Read By     TSH    Collection Time: 05/10/21 11:19 AM   Result Value Ref Range    TSH 2.830 0.380 - 5.330 uIU/mL   FREE THYROXINE    Collection Time: 05/10/21 11:19 AM   Result Value Ref Range    Free T-4 0.98 0.93 - 1.70 ng/dL   THYROID PEROX AB TPO (REFLEX)    Collection Time: 05/10/21 11:19 AM   Result Value Ref Range    Microsomal -Tpo- Abs 25.8 (H) 0.0 - 9.0 IU/mL   BILE ACIDS, SERUM    Collection Time: 05/10/21 11:21 AM   Result Value Ref Range    Bile Acids Serum 16 (H) 0 - 10 umol/L   URINALYSIS,CULTURE IF INDICATED    Collection Time: 05/10/21 11:21 AM   Result Value Ref Range    Color DK Yellow     Character Cloudy (A)     Specific Gravity 1.024 <1.035    Ph 8.0 5.0 -  8.0    Glucose Negative Negative mg/dL    Ketones Negative Negative mg/dL    Protein Negative Negative mg/dL    Bilirubin Negative Negative    Urobilinogen, Urine 1.0 Negative    Nitrite Negative Negative    Leukocyte Esterase Negative Negative    Occult Blood Negative Negative    Micro Urine Req Microscopic    URINE MICROSCOPIC (W/UA)    Collection Time: 05/10/21 11:21 AM   Result Value Ref Range    WBC 0-2 /hpf    RBC 0-2 /hpf    Bacteria Negative None /hpf    Epithelial Cells Few /hpf    Mucous Threads Few /hpf    Amorphous Crystal Present /hpf    Hyaline Cast 0-2 /lpf   POCT Glucose    Collection Time: 05/13/21 10:40 AM   Result Value Ref Range    Glucose - Accu-Ck 103 (A) 70 - 100 mg/dL   POCT Fetal Nonstress Test    Collection Time: 05/17/21  9:45 AM   Result Value Ref Range    NST Indications      NST Baseline      NST Uterine Activity      NST Acoustic Stimulation      NST Assessment      NST Action Necessary      NST Other Data      NST Return      NST Read By     BILE ACIDS,FRACTIONATED    Collection Time: 05/17/21 11:45 AM   Result Value Ref Range    Ursodeoxycholic Acid 7.4 (H) 0.0 - 1.0 umol/L    Cholic Acid 1.0 0.0 - 1.9 umol/L    Chenodeoxycholic Acid 1.3 0.0 - 3.4 umol/L    Deoxycholic Acid 2.0 0.0 - 2.5 umol/L    Bile Acids Serum 11.7 (H) 0.0 - 7.0 umol/L   POCT Glucose    Collection Time: 05/20/21  8:53 AM   Result Value Ref Range    Glucose - Accu-Ck 86 70 - 100 mg/dL   POCT Fetal Nonstress Test    Collection Time: 05/20/21  9:05 AM   Result Value Ref Range    NST Indications      NST Baseline      NST Uterine Activity      NST Acoustic Stimulation      NST Assessment      NST Action Necessary      NST Other Data      NST Return      NST Read By     GRP B STREP, BY PCR (WHEELER BROTH)    Collection Time: 05/20/21  9:35 AM    Specimen: Genital   Result Value Ref Range    Strep Gp B DNA PCR POSITIVE (A) Negative   SARS-CoV-2 PCR (24 hour In-House): Collect NP swab in VTM    Collection Time: 05/21/21  12:46 PM    Specimen: Nasopharyngeal; Respirate   Result Value Ref Range    SARS-CoV-2 Source NP Swab     SARS-CoV-2 by PCR NotDetected    CBC WITH DIFFERENTIAL    Collection Time: 21  8:35 AM   Result Value Ref Range    WBC 8.3 4.8 - 10.8 K/uL    RBC 4.73 4.20 - 5.40 M/uL    Hemoglobin 13.3 12.0 - 16.0 g/dL    Hematocrit 40.6 37.0 - 47.0 %    MCV 85.8 81.4 - 97.8 fL    MCH 28.1 27.0 - 33.0 pg    MCHC 32.8 (L) 33.6 - 35.0 g/dL    RDW 42.0 35.9 - 50.0 fL    Platelet Count 223 164 - 446 K/uL    MPV 10.7 9.0 - 12.9 fL    Neutrophils-Polys 62.30 44.00 - 72.00 %    Lymphocytes 27.50 22.00 - 41.00 %    Monocytes 7.60 0.00 - 13.40 %    Eosinophils 1.60 0.00 - 6.90 %    Basophils 0.40 0.00 - 1.80 %    Immature Granulocytes 0.60 0.00 - 0.90 %    Nucleated RBC 0.00 /100 WBC    Neutrophils (Absolute) 5.14 2.00 - 7.15 K/uL    Lymphs (Absolute) 2.27 1.00 - 4.80 K/uL    Monos (Absolute) 0.63 0.00 - 0.85 K/uL    Eos (Absolute) 0.13 0.00 - 0.51 K/uL    Baso (Absolute) 0.03 0.00 - 0.12 K/uL    Immature Granulocytes (abs) 0.05 0.00 - 0.11 K/uL    NRBC (Absolute) 0.00 K/uL   HOLD BLOOD BANK SPECIMEN (NOT TESTED)    Collection Time: 21  8:35 AM   Result Value Ref Range    Holding Tube - Bb DONE         Assessment:   1.  IUP at 37w0d  2.  Labor status: Not in labor.  3.  Cat 1 FHTs  4.  Obstetrical history significant for   Patient Active Problem List    Diagnosis Date Noted   • Obesity affecting pregnancy in third trimester 2021   • GDM, class A2 2021   • Cholestasis during pregnancy in third trimester 2021   • History of cholestasis during pregnancy 2021   • Umbilical hernia without obstruction and without gangrene 2021   • History of  section 2021   • Hashimoto's disease    • Pregnancy, supervision, high-risk, third trimester    • Asthma affecting pregnancy in second trimester    .      Plan:     Admit to L&D  GBS positive  Routine labs  Pain management prn    Preoperative  counseling performed;  Discussed the risks of repeat  section including risk of pain bleeding infection damage to any or all internal organs including but limited to bowel intestines bladder uterus tubes ovaries nerves blood vessels skin and baby  Possible incision seroma or hematoma possible infection of the uterus or subcutaneous tissue/skin  Patient has consented to blood transfusion as needed  The patient declines sterilization at the present time  We discussed the increased risk of damage to internal organs due to possible scar tissue from previous  sections x2.    Kacie Matias RN present for counseling session

## 2021-05-24 NOTE — ANESTHESIA TIME REPORT
Anesthesia Start and Stop Event Times     Date Time Event    2021 1216 Ready for Procedure     1245 Anesthesia Start     1355 Anesthesia Stop        Responsible Staff  21    Name Role Begin End    Endy Cuadra M.D. Anesth 1245 1355        Preop Diagnosis (Free Text):  Pre-op Diagnosis     IUP 37w Repeat  SECTION, CHOLESTASIS, GDM        Preop Diagnosis (Codes):    Post op Diagnosis  Status post repeat low transverse  section      Premium Reason  Non-Premium    Comments:

## 2021-05-24 NOTE — CARE PLAN
Problem: Pain  Goal: Patient's pain will be alleviated or reduced to the patient’s comfort goal  Outcome: Progressing    Pt knowledgeable of personalized pain management and will notify RN if interventions are desired.       Problem: Risk for Infection and Impaired Wound Healing  Goal: Patient will remain free from infection  Outcome: Progressing  Goal: Patient's wound/surgical incision will decrease in size and heals properly  Outcome: Progressing    Pt knowledgeable of process. We are not doing a salpingectomy today. Pt encouraged to notify RN and ask questions. Importance of hand hygiene discussed with pt and FOB.

## 2021-05-24 NOTE — ANESTHESIA PROCEDURE NOTES
Spinal Block    Date/Time: 5/24/2021 12:51 PM  Performed by: Endy Cuadra M.D.  Authorized by: Endy Cuadra M.D.     Patient Location:  OR  Start Time:  5/24/2021 12:51 PM  End Time:  5/24/2021 12:58 PM  Reason for Block: primary anesthetic    patient identified, IV checked, site marked, risks and benefits discussed, surgical consent, monitors and equipment checked, pre-op evaluation and timeout performed    Patient Position:  Sitting  Prep: ChloraPrep, patient draped and sterile technique    Monitoring:  Blood pressure, continuous pulse oximetry and heart rate  Approach:  Midline  Location:  L3-4  Injection Technique:  Single-shot  Skin infiltration:  Lidocaine  Strength:  1%  Dose:  3ml  Needle Type:  Pencan  Needle Gauge:  25 G  CSF flowing pre/post injection:  Yes  Sensory Level:  T4 (C7)

## 2021-05-24 NOTE — PROGRESS NOTES
Pt is a ; ANDI of ; making her 37w. Pt is here for a repeat  section for GDM and cholestasis. IV started, labs drawn, admission done. Pt prepped for  section and is knowledgeable of delay at this time.     Okay to transfer back to OR. PT transferred to OR2 at 1240.     Pt transferred to PACU. Report given to Chrissy CULVER.

## 2021-05-25 PROCEDURE — 700111 HCHG RX REV CODE 636 W/ 250 OVERRIDE (IP): Performed by: ANESTHESIOLOGY

## 2021-05-25 PROCEDURE — 700102 HCHG RX REV CODE 250 W/ 637 OVERRIDE(OP): Performed by: OBSTETRICS & GYNECOLOGY

## 2021-05-25 PROCEDURE — A9270 NON-COVERED ITEM OR SERVICE: HCPCS | Performed by: ANESTHESIOLOGY

## 2021-05-25 PROCEDURE — 770002 HCHG ROOM/CARE - OB PRIVATE (112)

## 2021-05-25 PROCEDURE — A9270 NON-COVERED ITEM OR SERVICE: HCPCS | Performed by: OBSTETRICS & GYNECOLOGY

## 2021-05-25 PROCEDURE — 700102 HCHG RX REV CODE 250 W/ 637 OVERRIDE(OP): Performed by: ANESTHESIOLOGY

## 2021-05-25 RX ORDER — DIPHENHYDRAMINE HCL 25 MG
25 TABLET ORAL EVERY 6 HOURS PRN
Status: DISCONTINUED | OUTPATIENT
Start: 2021-05-25 | End: 2021-05-27 | Stop reason: HOSPADM

## 2021-05-25 RX ORDER — OXYCODONE HYDROCHLORIDE AND ACETAMINOPHEN 5; 325 MG/1; MG/1
1 TABLET ORAL EVERY 4 HOURS PRN
Status: DISCONTINUED | OUTPATIENT
Start: 2021-05-25 | End: 2021-05-27 | Stop reason: HOSPADM

## 2021-05-25 RX ORDER — ONDANSETRON 4 MG/1
4 TABLET, ORALLY DISINTEGRATING ORAL EVERY 6 HOURS PRN
Status: DISCONTINUED | OUTPATIENT
Start: 2021-05-25 | End: 2021-05-27 | Stop reason: HOSPADM

## 2021-05-25 RX ORDER — DIPHENHYDRAMINE HYDROCHLORIDE 50 MG/ML
25 INJECTION INTRAMUSCULAR; INTRAVENOUS EVERY 6 HOURS PRN
Status: DISCONTINUED | OUTPATIENT
Start: 2021-05-25 | End: 2021-05-27 | Stop reason: HOSPADM

## 2021-05-25 RX ORDER — OXYCODONE HYDROCHLORIDE 5 MG/1
10 TABLET ORAL EVERY 4 HOURS PRN
Status: DISCONTINUED | OUTPATIENT
Start: 2021-05-25 | End: 2021-05-27 | Stop reason: HOSPADM

## 2021-05-25 RX ORDER — IBUPROFEN 600 MG/1
600 TABLET ORAL EVERY 6 HOURS PRN
Status: DISCONTINUED | OUTPATIENT
Start: 2021-05-25 | End: 2021-05-27 | Stop reason: HOSPADM

## 2021-05-25 RX ORDER — ACETAMINOPHEN 325 MG/1
325 TABLET ORAL EVERY 4 HOURS PRN
Status: DISCONTINUED | OUTPATIENT
Start: 2021-05-25 | End: 2021-05-27 | Stop reason: HOSPADM

## 2021-05-25 RX ORDER — ONDANSETRON 2 MG/ML
4 INJECTION INTRAMUSCULAR; INTRAVENOUS EVERY 6 HOURS PRN
Status: DISCONTINUED | OUTPATIENT
Start: 2021-05-25 | End: 2021-05-27 | Stop reason: HOSPADM

## 2021-05-25 RX ORDER — MORPHINE SULFATE 4 MG/ML
4 INJECTION, SOLUTION INTRAMUSCULAR; INTRAVENOUS
Status: DISCONTINUED | OUTPATIENT
Start: 2021-05-25 | End: 2021-05-27 | Stop reason: HOSPADM

## 2021-05-25 RX ADMIN — KETOROLAC TROMETHAMINE 30 MG: 30 INJECTION, SOLUTION INTRAMUSCULAR; INTRAVENOUS at 02:12

## 2021-05-25 RX ADMIN — IBUPROFEN 600 MG: 600 TABLET, FILM COATED ORAL at 13:40

## 2021-05-25 RX ADMIN — ACETAMINOPHEN 1000 MG: 500 TABLET ORAL at 10:56

## 2021-05-25 RX ADMIN — ACETAMINOPHEN 1000 MG: 500 TABLET ORAL at 04:02

## 2021-05-25 RX ADMIN — DOCUSATE SODIUM 100 MG: 100 CAPSULE ORAL at 20:06

## 2021-05-25 RX ADMIN — OXYCODONE 10 MG: 5 TABLET ORAL at 13:40

## 2021-05-25 RX ADMIN — DOCUSATE SODIUM 100 MG: 100 CAPSULE ORAL at 07:27

## 2021-05-25 RX ADMIN — KETOROLAC TROMETHAMINE 30 MG: 30 INJECTION, SOLUTION INTRAMUSCULAR; INTRAVENOUS at 07:27

## 2021-05-25 RX ADMIN — IBUPROFEN 600 MG: 600 TABLET, FILM COATED ORAL at 20:06

## 2021-05-25 RX ADMIN — OXYCODONE 10 MG: 5 TABLET ORAL at 20:06

## 2021-05-25 ASSESSMENT — PAIN DESCRIPTION - PAIN TYPE
TYPE: SURGICAL PAIN

## 2021-05-25 NOTE — LACTATION NOTE
This note was copied from a baby's chart.  Baby 37 weeks, , weight loss 1.39%, MOB Hx GDM- insulin during end of pregnancy, Hashimoto's, Cholestasis. MOB reports she  previous babies for 10 months & 16 months, with good supply. MOB reports baby is latching well, occasional latches feel pinchy, mother asked if baby has tongue or lip tie. Nipples assessed bruising bilaterally, discussed positioning baby at breast for deep latches. Mouth assessment done, tongue & lip appear WNL. Baby's blood sugar low, RN pace bottle feeding DBM at this time. Baby fussy after bottle feeding, placed baby STS, using cross cradle hold on right breast, assisted with positioning baby at breast, quickly obtained deep latch with coordinated sucks, MOB describes latch as comofrtable- see latch assessment score.     Teaching on hunger cues, breastfeeding when baby shows cues, breastfeed a minimum 8 times in 24 hours no longer than 4 hours from last feed, importance of STS & cluster feeding. MOB has own hand pump, prefers to use own pump when baby is supplemented, to stimulate breasts.    MOB is not eligible for [facility], information sheet on [facility] & [facility] provided.    Breastfeeding plan:  Breastfeed a minimum 8x/24 hours no longer than 3-4 hours from last feed. If supplementing with DBM for low BS's, pump to stimulate breasts for that feed.

## 2021-05-25 NOTE — CARE PLAN
Problem: Early Mobilization - Post Surgery  Goal: Early mobilization post surgery  Note: Encourage mobilization    Shift Goals  Clinical Goals: To keep pain under control  Patient Goals: less or no pain.

## 2021-05-25 NOTE — CARE PLAN
Problem: Altered Physiologic Condition  Goal: Patient physiologically stable as evidenced by normal lochia, palpable uterine involution and vitals within normal limits  Outcome: Progressing  Note: Fundal massage done with light bleeding       Shift Goals  Clinical Goals: To keep pain under control  Patient Goals: less or no pain.    Progress made toward(s) clinical / shift goals:  Met

## 2021-05-25 NOTE — PROGRESS NOTES
Obstetrics & Gynecology Post-Delivery Progress Note    Date of Service  2021    25 y.o.  1 s/p , Low Transverse   Delivery date: 2021  Breastfeeding: Yes    Events  No events    Subjective  Pain: Yes,  location incisional and controlled  Bleeding: lochia minimal  PO's: taking clears  Voiding: reich discontinued, awaiting spontaneous void  Ambulating: yes, without dizziness   Feeding: breastfeeding well    Objective  Temp:  [36.1 °C (97 °F)-37.1 °C (98.7 °F)] 36.7 °C (98 °F)  Pulse:  [] 67  Resp:  [16-18] 18  BP: ()/(53-71) 114/61  SpO2:  [94 %-100 %] 96 %    Physical Exam  General: well and resting  Chest/Breasts: nipples intact and breasts soft  Fundus: firm, below umbilicus and nontender  Incision: dressing clean, dry, intact  Perineum: deferred  Extremities: symmetric and no edema    Lab Results   Component Value Date    RBC 4.12 (L) 2021    ABOGROUP O 2021    RH POS 2021     Immunization History   Administered Date(s) Administered   • Tdap Vaccine 2016       Assessment/Plan  Stephanie Farnsworth is a 25 y.o.  postop day 1 s/p , Low Transverse .    1. Post care: meeting all goals  2. Hemodynamics: stable  3. Pain: controlled  4. PNL:   Lab Results   Component Value Date    RH POS 2021    RUBELLAIGG 108.00 2021     5. Method of Feeding: plans to breastfeed  6. Method of Contraception: NA  7. Vaccinations:   Immunization History   Administered Date(s) Administered   • Tdap Vaccine 2016     8. Disposition: likely home postop day 2-3, if discharged tomorrow needs to return to office for staple removal end of week.     No new Assessment & Plan notes have been filed under this hospital service since the last note was generated.  Service: Obstetrics & Gynecology       VTE prophylaxis: SCDS on     LAWRENCE Espinosa

## 2021-05-25 NOTE — PROGRESS NOTES
2000 Received awake on bed bonding with baby, Crump catheter in placed, with sequential stocking bilaterally, Assessment done wound covered with Mepilex Silver clean and dry, Pt complained of mild abdominal pain medicated her as per doctor orders, Encourage early mobilization, Encourage to call if she needs assistance, Needs attended.

## 2021-05-25 NOTE — PROGRESS NOTES
A bedside report received from Kacie CULVER @ 0700.  Assumed care. Patient ambulate well in the bathroom, voided with no difficulty. Discussed plan of care especially on managing pain. Assessment done. Medicated for pain. Encouraged ambulation in the hallway. Encouraged to call if with needs. Will check at intervals.

## 2021-05-25 NOTE — PROGRESS NOTES
Bedside report received from Chrissy CULVER @ 3114. Verified baby's wrist band with the patient and FOB. Cuddle is active. Oriented patient to the room, introduced the call light. Discussed plan of care. Assessment done. Encouraged to call if with need. Will check at intervals.

## 2021-05-26 PROCEDURE — A9270 NON-COVERED ITEM OR SERVICE: HCPCS | Performed by: OBSTETRICS & GYNECOLOGY

## 2021-05-26 PROCEDURE — 700102 HCHG RX REV CODE 250 W/ 637 OVERRIDE(OP): Performed by: OBSTETRICS & GYNECOLOGY

## 2021-05-26 PROCEDURE — 770002 HCHG ROOM/CARE - OB PRIVATE (112)

## 2021-05-26 RX ADMIN — DOCUSATE SODIUM 100 MG: 100 CAPSULE ORAL at 09:08

## 2021-05-26 RX ADMIN — OXYCODONE HYDROCHLORIDE AND ACETAMINOPHEN 1 TABLET: 5; 325 TABLET ORAL at 15:14

## 2021-05-26 RX ADMIN — IBUPROFEN 600 MG: 600 TABLET, FILM COATED ORAL at 09:07

## 2021-05-26 RX ADMIN — OXYCODONE 10 MG: 5 TABLET ORAL at 09:07

## 2021-05-26 RX ADMIN — IBUPROFEN 600 MG: 600 TABLET, FILM COATED ORAL at 02:33

## 2021-05-26 RX ADMIN — IBUPROFEN 600 MG: 600 TABLET, FILM COATED ORAL at 21:28

## 2021-05-26 RX ADMIN — IBUPROFEN 600 MG: 600 TABLET, FILM COATED ORAL at 15:14

## 2021-05-26 RX ADMIN — OXYCODONE 10 MG: 5 TABLET ORAL at 00:34

## 2021-05-26 RX ADMIN — OXYCODONE 10 MG: 5 TABLET ORAL at 05:26

## 2021-05-26 RX ADMIN — DOCUSATE SODIUM 100 MG: 100 CAPSULE ORAL at 21:28

## 2021-05-26 RX ADMIN — OXYCODONE HYDROCHLORIDE AND ACETAMINOPHEN 1 TABLET: 5; 325 TABLET ORAL at 21:28

## 2021-05-26 ASSESSMENT — PAIN DESCRIPTION - PAIN TYPE
TYPE: SURGICAL PAIN

## 2021-05-26 NOTE — CARE PLAN
The patient is Stable - Low risk of patient condition declining or worsening    Shift Goals  Clinical Goals: To keep pain under control.  Patient Goals: To be able to ambulate.    Progress made toward(s) clinical / shift goals:  Met

## 2021-05-26 NOTE — CARE PLAN
Problem: Early Mobilization - Post Surgery  Goal: Early mobilization post surgery  Outcome: Met  Note: Pt ambulated well      Shift Goals  Clinical Goals: To keep pain under control.  Patient Goals: To be able to ambulate.    Progress made toward(s) clinical / shift goals:  Met

## 2021-05-26 NOTE — PROGRESS NOTES
2000 pt doing well bonding with baby, Assessment done wound covered with Mepilex Silver clean and dry, Pt complained of moderate abdominal pain medicated her as per doctor orders, Encourage more ambulation, Needs attended.

## 2021-05-26 NOTE — CARE PLAN
Problem: Pain - Standard  Goal: Alleviation of pain or a reduction in pain to the patient’s comfort goal  Outcome: Progressing  Note: Pain medication given as requested       Shift Goals  Clinical Goals: To keep pain under control.  Patient Goals: To be able to ambulate.

## 2021-05-26 NOTE — LACTATION NOTE
This note was copied from a baby's chart.  Mom is a 26 y/o P3 who delivered baby boy weighing 5 # 15.4 oz at 37 wks. Mom breast fed her last child for 16 months. Mom has GDM and thyroid issues. Mom took insulin and is currently on Thyroid medication. Mom reports darker and enlarged areolas during pregnancy and has no fertility issues. Baby is latched on R side upon LC entry to room. Mom states she can hand express independently.  Mom reported bilateral abrasions to nipples and LC was able to assess L side and noted she does have some minimal darkened red area on tip of nipple. Reviewed proper latch and positioning and mom stated that it is improving with realigning baby at breast.   Mom has a pump at home for personal use.  gave mom a hand out on LPI characteristics and reviewed a possible feeding plan if baby loses more weight, has suboptimal feeds or low output. Last few sugars have been stable.  Mom verbally understands education. She has had an LPI previous with her first child. Highly encouraged to call for help if needed.  Hand outs were provided for mother for follow up support

## 2021-05-26 NOTE — PROGRESS NOTES
Discussed plan of care to patient. Assessment done. She will be medicated for pain as needed. Encouraged ambulation. Rest provided. Will check at intervals.

## 2021-05-27 ENCOUNTER — PHARMACY VISIT (OUTPATIENT)
Dept: PHARMACY | Facility: MEDICAL CENTER | Age: 26
End: 2021-05-27
Payer: COMMERCIAL

## 2021-05-27 VITALS
SYSTOLIC BLOOD PRESSURE: 109 MMHG | BODY MASS INDEX: 37.3 KG/M2 | TEMPERATURE: 97.1 F | HEART RATE: 100 BPM | OXYGEN SATURATION: 97 % | RESPIRATION RATE: 16 BRPM | HEIGHT: 60 IN | DIASTOLIC BLOOD PRESSURE: 72 MMHG | WEIGHT: 190 LBS

## 2021-05-27 PROCEDURE — 700102 HCHG RX REV CODE 250 W/ 637 OVERRIDE(OP): Performed by: OBSTETRICS & GYNECOLOGY

## 2021-05-27 PROCEDURE — A9270 NON-COVERED ITEM OR SERVICE: HCPCS | Performed by: OBSTETRICS & GYNECOLOGY

## 2021-05-27 PROCEDURE — RXMED WILLOW AMBULATORY MEDICATION CHARGE: Performed by: NURSE PRACTITIONER

## 2021-05-27 RX ORDER — OXYCODONE HYDROCHLORIDE AND ACETAMINOPHEN 5; 325 MG/1; MG/1
1 TABLET ORAL EVERY 4 HOURS PRN
Qty: 20 TABLET | Refills: 0 | Status: SHIPPED | OUTPATIENT
Start: 2021-05-27 | End: 2021-06-10

## 2021-05-27 RX ORDER — PSEUDOEPHEDRINE HCL 30 MG
100 TABLET ORAL 2 TIMES DAILY PRN
Qty: 60 CAPSULE | Refills: 4 | Status: SHIPPED | OUTPATIENT
Start: 2021-05-27 | End: 2021-06-22

## 2021-05-27 RX ORDER — IBUPROFEN 800 MG/1
800 TABLET ORAL EVERY 6 HOURS PRN
Qty: 30 TABLET | Refills: 4 | Status: SHIPPED | OUTPATIENT
Start: 2021-05-27 | End: 2021-06-22

## 2021-05-27 RX ADMIN — OXYCODONE HYDROCHLORIDE AND ACETAMINOPHEN 1 TABLET: 5; 325 TABLET ORAL at 03:08

## 2021-05-27 RX ADMIN — DOCUSATE SODIUM 100 MG: 100 CAPSULE ORAL at 09:05

## 2021-05-27 RX ADMIN — OXYCODONE HYDROCHLORIDE AND ACETAMINOPHEN 1 TABLET: 5; 325 TABLET ORAL at 09:05

## 2021-05-27 RX ADMIN — IBUPROFEN 600 MG: 600 TABLET, FILM COATED ORAL at 03:08

## 2021-05-27 RX ADMIN — IBUPROFEN 600 MG: 600 TABLET, FILM COATED ORAL at 09:05

## 2021-05-27 ASSESSMENT — PAIN DESCRIPTION - PAIN TYPE: TYPE: SURGICAL PAIN

## 2021-05-27 NOTE — DISCHARGE INSTRUCTIONS
PATIENT DISCHARGE EDUCATION INSTRUCTION SHEET    REASONS TO CALL YOUR OBSTETRICIAN  · Persistent fever, shaking, chills (Temperature higher than 100.4) may indicate you have an infection  · Heavy bleeding: soaking more than 1 pad per hour; Passing clots an egg-sized clot or bigger may mean you have an postpartum hemorrhage  · Foul odor from vagina or bad smelling or discolored discharge or blood  · Breast infection (Mastitis symptoms); breast pain, chills, fever, redness or red streaks, may feel flu like symptoms  · Urinary pain, burning or frequency  · Incision that is not healing, increased redness, swelling, tenderness or pain, or any pus from episiotomy or  site may mean you have an infection  · Redness, swelling, warmth, or painful to touch in the calf area of your leg may mean you have a blood clot  · Severe or intensified depression, thoughts or feelings of wanting to hurt yourself or someone else   · Pain in chest, obstructed breathing or shortness of breath (trouble catching your breath) may mean you are having a postpartum complication. Call your provider immediately   · Headache that does not get better, even after taking medicine, a bad headache with vision changes or pain in the upper right area of your belly may mean you have high blood pressure or post birth preeclampsia. Call your provider immediately    HAND WASHING  All family and friends should wash their hands:  · Before and after holding the baby  · Before feeding the baby  · After using the restroom or changing the baby's diaper    WOUND CARE  Ask your physician for additional care instructions. In general:  ·  Incision:  · May shower and pat incision dry   · Keep the incision clean and dry  · There should not be any opening or pus from the incision  · Continue to walk at home 3 times a day   · Do NOT lift anything heavier than your baby (over 10 pounds)  · Encourage family to help participate in care of the  to allow  rest and mom time to heal  Sitz Bath   · Sit in 6 inches of warm water and soak laceration as needed until the laceration heals    VAGINAL CARE AND BLEEDING  · Nothing inside vagina for 6 weeks:   · No sexual intercourse, tampons or douching  · Bleeding may continue for 2-4 weeks. Amount and color may vary  · Soaking 1 pad or more in an hour for several hours is considered heavy bleeding  · Passing large egg sized blood clots can be concerning  · If you feel like you have heavy bleeding or are having increasing amount of blood clots call your Obstetrician immediately  · If you begin feeling faint upon standing, feeling sick to your stomach, have clammy skin, a really fast heartbeat, have chills, start feeling confused, dizzy, sleepy or weak, or feeling like you're going to faint call your Obstetrician immediately    HYPERTENSION   Preeclampsia or gestational hypertension are types of high blood pressure that only pregnant women can get. It is important for you to be aware of symptoms to seek early intervention and treatment. If you have any of these symptoms immediately call your Obstetrician    · Vision changes or blurred vision   · Severe headache or pain that is unrelieved with medication and will not go away  · Persistent pain in upper abdomen or shoulder   · Increased swelling of face, feet, or hands  · Difficulty breathing or shortness of breath at rest  · Urinating less than usual    URINATION AND BOWEL MOVEMENTS  · Eating more fiber (bran cereal, fruits, and vegetables) and drinking plenty of fluids will help to avoid constipation  · Urinary frequency and urgency after childbirth is normal  · If you experience any urinary pain, burning or frequency call your provider    BIRTH CONTROL  · It is possible to become pregnant at any time after delivery and while breastfeeding  · Plan to discuss a method of birth control with your physician at your post delivery follow up visit    POSTPARTUM BLUES  During the first  "few days after birth, you may experience a sense of the \"blues\" which may include impatience, irritability or even crying. These feelings come and go quickly. However, as many as 1 in 10 women experience emotional symptoms known as postpartum depression.     POSTPARTUM DEPRESSION  May start as early as the second or third day after delivery or take several weeks or months to develop. Symptoms of \"blues\" are present, but are more intense: Crying spells; loss of appetite; feelings of hopelessness or loss of control; fear of touching the baby; over concern or no concern at all about the baby; little or no concern about your own appearance/caring for yourself; and/or inability to sleep or excessive sleeping. Contact your Obstetrician if you are experiencing any of these symptoms     PREVENTING SHAKEN BABY  If you are angry or stressed, PUT THE BABY IN THE CRIB, step away, take some deep breaths, and wait until you are calm to care for the baby. DO NOT SHAKE THE BABY. You are not alone, call a supporter for help.  · [facility] 24/7 crisis call line (580-961-2376) or (1-100.645.5355)  · You can also text them, text \"ANSWER\" (602358)      "

## 2021-05-27 NOTE — DISCHARGE SUMMARY
Discharge Summary:      Stephanie Farnsworth    Admit Date:   2021  Discharge Date:  2021     Admitting diagnosis:  Labor and delivery indication for care or intervention [O75.9]  Discharge Diagnosis: Status post  for repeat.  Pregnancy Complications: gestational diabetes, cholestasis of pregnancy   Tubal Ligation:  no        History:  Past Medical History:   Diagnosis Date   • GDM, class A2 2021   • Hashimoto's disease    • Hashimoto's disease 2019   • Infertility, female    • Miscarriage 2015    twice   • Unspecified asthma(493.90)      OB History    Para Term  AB Living   5 3 3 0 2 3   SAB TAB Ectopic Molar Multiple Live Births   2 0 0   0 3      # Outcome Date GA Lbr Bradley/2nd Weight Sex Delivery Anes PTL Lv   5 Term 21 37w0d  2.87 kg (6 lb 5.2 oz) F CS-LTranv Spinal N CARLITA   4 SAB 19           3 Term 17 38w0d  3.09 kg (6 lb 13 oz) F CS-LTranv   CARLITA      Complications: Cholestasis during pregnancy in third trimester   2 Term 16 37w1d  3.685 kg (8 lb 2 oz) M CS-Unspec  N CARLITA      Complications: Cholestasis during pregnancy in third trimester, Failed induction of labor   1 SAB 04/29/15 6w0d               Corn oil  Patient Active Problem List    Diagnosis Date Noted   • Obesity affecting pregnancy in third trimester 2021   • GDM, class A2 2021   • Cholestasis during pregnancy in third trimester 2021   • History of cholestasis during pregnancy 2021   • Umbilical hernia without obstruction and without gangrene 2021   • History of  section 2021   • Hashimoto's disease    • Pregnancy, supervision, high-risk, third trimester    • Asthma affecting pregnancy in second trimester         Hospital Course:   25 y.o. , now para 3, was admitted with the above mentioned diagnosis, underwent Repeat  repeat,  for repeat. Patient postpartum course was unremarkable, with progressive advancement in  diet , ambulation and toleration of oral analgesia. Patient without complaints today and desires discharge.      Vitals:    05/24/21 0731 05/26/21 0600 05/26/21 1800 05/26/21 2200   BP:  104/69 (!) 99/57 102/63   Pulse:  61 71 71   Resp:  16 16 16   Temp:  36.2 °C (97.2 °F) 36.2 °C (97.2 °F) 36.2 °C (97.1 °F)   TempSrc:  Temporal Temporal Temporal   SpO2:  99% 100% 98%   Weight: 86.2 kg (190 lb)      Height: 1.524 m (5')          Current Facility-Administered Medications   Medication Dose   • ibuprofen (MOTRIN) tablet 600 mg  600 mg   • acetaminophen (Tylenol) tablet 325 mg  325 mg   • oxyCODONE-acetaminophen (PERCOCET) 5-325 MG per tablet 1 tablet  1 tablet   • oxyCODONE immediate-release (ROXICODONE) tablet 10 mg  10 mg   • morphine (pf) 4 mg/mL injection 4 mg  4 mg   • ondansetron (ZOFRAN) syringe/vial injection 4 mg  4 mg    Or   • ondansetron (ZOFRAN ODT) dispertab 4 mg  4 mg   • diphenhydrAMINE (BENADRYL) tablet/capsule 25 mg  25 mg    Or   • diphenhydrAMINE (BENADRYL) injection 25 mg  25 mg   • lactated ringers infusion     • LR infusion     • PRN oxytocin (PITOCIN) (20 Units/1000 mL) PRN for excessive uterine bleeding - See Admin Instr  125-999 mL/hr   • miSOPROStol (CYTOTEC) tablet 600 mcg  600 mcg   • methylergonovine (METHERGINE) injection 0.2 mg  0.2 mg   • docusate sodium (COLACE) capsule 100 mg  100 mg   • scopolamine (TRANSDERM-SCOP) patch 1 Patch  1 Patch       Exam:  Breast Exam: negative  Abdomen: Abdomen soft, non-tender. BS normal. No masses,  No organomegaly  Fundus Non Tender: yes  Incision: dry and intact  Perineum: perineum intact  Extremity: extremities, peripheral pulses and reflexes normal, no edema, redness or tenderness in the calves or thighs     Labs:  Recent Labs     05/24/21  0835 05/24/21  2211   WBC 8.3 10.2   RBC 4.73 4.12*   HEMOGLOBIN 13.3 11.5*   HEMATOCRIT 40.6 35.9*   MCV 85.8 87.1   MCH 28.1 27.9   MCHC 32.8* 32.0*   RDW 42.0 42.1   PLATELETCT 223 198   MPV 10.7 11.0  "       Activity:   Discharge to home  Pelvic Rest x 6 weeks    Assessment:  normal postpartum course  Discharge Assessment: No areas of skin breakdown/redness; surgical incision intact/healing     Follow up: [facility] in 5 weeks for vaginal ; 1 week for incision check.  Pt reports she \"does not use birth control\"        Discharge Meds:   Current Outpatient Medications   Medication Sig Dispense Refill   • docusate sodium 100 MG Cap Take 100 mg by mouth 2 times a day as needed for Constipation. 60 capsule 4   • ibuprofen (MOTRIN) 800 MG Tab Take 1 tablet by mouth every 6 hours as needed (For cramping after delivery; do not give if patient is receiving ketorolac (Toradol)). 30 tablet 4   • oxyCODONE-acetaminophen (PERCOCET) 5-325 MG Tab Take 1 tablet by mouth every four hours as needed for up to 14 days. 20 tablet 0       Cele Banks A.P.R.N.        "

## 2021-05-27 NOTE — DISCHARGE PLANNING
Meds-to-Beds: Discharge prescription orders listed below delivered to patient's bedside. RN notified. Patient counseled.       Stephanie Farnsworth   Home Medication Instructions LAZARUS:[id number]    Printed on:05/27/21 1059   Medication Information                      docusate sodium 100 MG Cap  Take 1 capsule by mouth 2 times a day as needed for Constipation.             ibuprofen (MOTRIN) 800 MG Tab  Take 1 tablet by mouth every 6 hours as needed For cramping after delivery             oxyCODONE-acetaminophen (PERCOCET) 5-325 MG Tab  Take 1 tablet by mouth every four hours as needed for up to 14 days.               Krystal Childers, PharmD

## 2021-05-27 NOTE — CARE PLAN
The patient is Stable - Low risk of patient condition declining or worsening    Shift Goals  Clinical Goals: To keep pain under control.  Patient Goals: to be able to walk in the hallway.    Progress made toward(s) clinical / shift goals:  Progressing

## 2021-05-27 NOTE — CARE PLAN
Problem: Pain - Standard  Goal: Alleviation of pain or a reduction in pain to the patient’s comfort goal  Outcome: Met  Note: Pain controlled   The patient is hemodynamically stable    Shift Goals  Clinical Goals: To keep pain under control.  Patient Goals: to be able to walk in the hallway.    Progress made toward(s) clinical / shift goals:  Met

## 2021-05-27 NOTE — PROGRESS NOTES
"0702- Bedside report received from ABIOLA Hester.  Patient denied needs.  0858- Patient sitting up in bedside chair, feeding infant independently.  Patient c/o headache and incisional pain, rated as \"6\" out of 10.  Patient denied blurred vision, epigastric pain, and nausea.  1045- Patient assessment done.  Patient stated that she is voiding without difficulty and passing flatus.  Patient denied dizziness and stated that she is walking without difficulty.  Reviewed plan of care.  Patient reported good pain relief with PO pain medication and rated headache and incisional pain as \"3\" out of 10.    1220- Patient denied pain.  1300- Patient stated she read the written patient discharge education/instruction sheet and has no questions.  Discharge instructions reviewed with patient who verbalized understanding and stated she has no questions.  1310- Patient stated that she is ready for discharge.  Patient discharged to home, no change noted in condition, via wheelchair with infant and FOB.  "

## 2021-05-27 NOTE — CARE PLAN
The patient is Stable - Low risk of patient condition declining or worsening    Shift Goals  Clinical Goals: Maintain VS WDL, fundus firm, lochia light; Maintain good pain control as reported by patient  Patient Goals: to be able to walk in the hallway.    Progress made toward(s) clinical / shift goals:  MET

## 2021-05-27 NOTE — PROGRESS NOTES
1930 Pt doing well bonding with baby, Assessment done denies pain at this time, Encourage more ambulation, Needs attended.

## 2021-05-27 NOTE — CARE PLAN
Problem: Infection - Postpartum  Goal: Postpartum patient will be free of signs and symptoms of infection  Note: Fundal massage done with light bleeding

## 2021-07-06 ENCOUNTER — POST PARTUM (OUTPATIENT)
Dept: OBGYN | Facility: CLINIC | Age: 26
End: 2021-07-06
Payer: MEDICAID

## 2021-07-06 VITALS — BODY MASS INDEX: 35.35 KG/M2 | DIASTOLIC BLOOD PRESSURE: 70 MMHG | SYSTOLIC BLOOD PRESSURE: 110 MMHG | WEIGHT: 181 LBS

## 2021-07-06 PROCEDURE — 0503F POSTPARTUM CARE VISIT: CPT | Performed by: NURSE PRACTITIONER

## 2021-07-06 ASSESSMENT — EDINBURGH POSTNATAL DEPRESSION SCALE (EPDS)
I HAVE FELT SAD OR MISERABLE: NO, NOT AT ALL
I HAVE BLAMED MYSELF UNNECESSARILY WHEN THINGS WENT WRONG: NO, NEVER
I HAVE FELT SCARED OR PANICKY FOR NO GOOD REASON: NO, NOT MUCH
THE THOUGHT OF HARMING MYSELF HAS OCCURRED TO ME: NEVER
I HAVE LOOKED FORWARD WITH ENJOYMENT TO THINGS: AS MUCH AS I EVER DID
I HAVE BEEN SO UNHAPPY THAT I HAVE BEEN CRYING: NO, NEVER
I HAVE BEEN ABLE TO LAUGH AND SEE THE FUNNY SIDE OF THINGS: AS MUCH AS I ALWAYS COULD
I HAVE BEEN SO UNHAPPY THAT I HAVE HAD DIFFICULTY SLEEPING: NOT AT ALL
I HAVE BEEN ANXIOUS OR WORRIED FOR NO GOOD REASON: HARDLY EVER
THINGS HAVE BEEN GETTING ON TOP OF ME: NO, MOST OF THE TIME I HAVE COPED QUITE WELL
TOTAL SCORE: 3

## 2021-07-06 ASSESSMENT — FIBROSIS 4 INDEX: FIB4 SCORE: 0.73

## 2021-07-06 NOTE — PROGRESS NOTES
SUBJECTIVE:  Stephanie Farnsworth presents to the clinic 6 weeks following RCS. Breastfeeding well after a bout of mastitis.  Breasts are healing well.  Declines any contraception today.  Follows a NFP method and abstains during fertile time.  Had a lot of trouble getting pregnant.      Eating a regular diet without difficulty. Bowel movement are Normal.  The patient is not having any pain. No VB. Patient Denies Incisional pain, drainage or redness    OBJECTIVE:  EPDS: 3    /70   Wt 82.1 kg (181 lb)   LMP 09/07/2020 (Exact Date)   BMI 35.35 kg/m²   Current Outpatient Medications on File Prior to Visit   Medication Sig Dispense Refill   • Prenatal Multivit-Min-Fe-FA (PRENATAL 1 + IRON PO) Take  by mouth.       No current facility-administered medications on file prior to visit.     alert, no distressConstitutional:  alert, no distress.  Abdomen:  soft, bowel sounds active, non-tender.  Incision:  healing well, no drainage, no erythema, no hernia, no seroma, no swelling, no dehiscence, incision well healed.    IMPRESSION: Doing well postoperatively.  Pt is to increase activities as tolerated.    Lab: No results found for this or any previous visit (from the past 1008 hour(s)).    PLAN:  Continue any current medications.  (See Med List for details.)  D/w pt possible difficulty in monitoring NFP when fully breastfeeding.   Encourage pt to f/u w PCP 6mos PP for repeat 1hr GTT.  Pap at next visit per pt.  May resume normal activities.   Return to clinic  : in 1 year.    Chaperone offered and provided by Wilma Frias MA.

## 2021-07-06 NOTE — PROGRESS NOTES
Pt here today for postpartum exam.  Delivery type: C Section 5/24/21  Currently : breast feeding  Desired BCM:  None needed  LMP: n/a  Last pap: 2017 but patient declines to have it done today  Phone # [phone]

## 2022-03-24 PROBLEM — Z87.59 HISTORY OF CHOLESTASIS DURING PREGNANCY: Status: RESOLVED | Noted: 2021-02-05 | Resolved: 2022-03-24

## 2022-03-24 PROBLEM — K83.1 CHOLESTASIS DURING PREGNANCY IN THIRD TRIMESTER: Status: RESOLVED | Noted: 2021-04-16 | Resolved: 2022-03-24

## 2022-03-24 PROBLEM — O26.613 CHOLESTASIS DURING PREGNANCY IN THIRD TRIMESTER: Status: RESOLVED | Noted: 2021-04-16 | Resolved: 2022-03-24

## 2022-03-24 PROBLEM — Z87.19 HISTORY OF CHOLESTASIS DURING PREGNANCY: Status: RESOLVED | Noted: 2021-02-05 | Resolved: 2022-03-24

## 2022-07-06 PROBLEM — O99.213 OBESITY AFFECTING PREGNANCY IN THIRD TRIMESTER: Status: RESOLVED | Noted: 2021-04-29 | Resolved: 2022-07-06

## 2022-07-10 ENCOUNTER — APPOINTMENT (OUTPATIENT)
Dept: RADIOLOGY | Facility: MEDICAL CENTER | Age: 27
End: 2022-07-10
Attending: EMERGENCY MEDICINE
Payer: MEDICAID

## 2022-07-10 ENCOUNTER — HOSPITAL ENCOUNTER (EMERGENCY)
Facility: MEDICAL CENTER | Age: 27
End: 2022-07-10
Attending: EMERGENCY MEDICINE
Payer: MEDICAID

## 2022-07-10 VITALS
DIASTOLIC BLOOD PRESSURE: 68 MMHG | OXYGEN SATURATION: 99 % | BODY MASS INDEX: 40.69 KG/M2 | SYSTOLIC BLOOD PRESSURE: 114 MMHG | HEIGHT: 60 IN | RESPIRATION RATE: 18 BRPM | WEIGHT: 207.23 LBS | TEMPERATURE: 97.5 F | HEART RATE: 64 BPM

## 2022-07-10 DIAGNOSIS — K59.00 CONSTIPATION, UNSPECIFIED CONSTIPATION TYPE: ICD-10-CM

## 2022-07-10 DIAGNOSIS — Z3A.09 9 WEEKS GESTATION OF PREGNANCY: ICD-10-CM

## 2022-07-10 LAB
ALBUMIN SERPL BCP-MCNC: 4.6 G/DL (ref 3.2–4.9)
ALBUMIN/GLOB SERPL: 1.4 G/DL
ALP SERPL-CCNC: 107 U/L (ref 30–99)
ALT SERPL-CCNC: 14 U/L (ref 2–50)
ANION GAP SERPL CALC-SCNC: 11 MMOL/L (ref 7–16)
APPEARANCE UR: ABNORMAL
AST SERPL-CCNC: 19 U/L (ref 12–45)
B-HCG SERPL-ACNC: ABNORMAL MIU/ML (ref 0–5)
BACTERIA #/AREA URNS HPF: NEGATIVE /HPF
BASOPHILS # BLD AUTO: 0.6 % (ref 0–1.8)
BASOPHILS # BLD: 0.05 K/UL (ref 0–0.12)
BILIRUB SERPL-MCNC: 0.3 MG/DL (ref 0.1–1.5)
BILIRUB UR QL STRIP.AUTO: NEGATIVE
BUN SERPL-MCNC: 5 MG/DL (ref 8–22)
CALCIUM SERPL-MCNC: 9.5 MG/DL (ref 8.5–10.5)
CHLORIDE SERPL-SCNC: 103 MMOL/L (ref 96–112)
CO2 SERPL-SCNC: 21 MMOL/L (ref 20–33)
COLOR UR: YELLOW
CREAT SERPL-MCNC: 0.71 MG/DL (ref 0.5–1.4)
EOSINOPHIL # BLD AUTO: 0.07 K/UL (ref 0–0.51)
EOSINOPHIL NFR BLD: 0.8 % (ref 0–6.9)
EPI CELLS #/AREA URNS HPF: NORMAL /HPF
ERYTHROCYTE [DISTWIDTH] IN BLOOD BY AUTOMATED COUNT: 43.9 FL (ref 35.9–50)
GFR SERPLBLD CREATININE-BSD FMLA CKD-EPI: 118 ML/MIN/1.73 M 2
GLOBULIN SER CALC-MCNC: 3.2 G/DL (ref 1.9–3.5)
GLUCOSE SERPL-MCNC: 110 MG/DL (ref 65–99)
GLUCOSE UR STRIP.AUTO-MCNC: NEGATIVE MG/DL
HCT VFR BLD AUTO: 43.8 % (ref 37–47)
HGB BLD-MCNC: 14.3 G/DL (ref 12–16)
HYALINE CASTS #/AREA URNS LPF: NORMAL /LPF
IMM GRANULOCYTES # BLD AUTO: 0.04 K/UL (ref 0–0.11)
IMM GRANULOCYTES NFR BLD AUTO: 0.4 % (ref 0–0.9)
KETONES UR STRIP.AUTO-MCNC: ABNORMAL MG/DL
LEUKOCYTE ESTERASE UR QL STRIP.AUTO: NEGATIVE
LIPASE SERPL-CCNC: 19 U/L (ref 11–82)
LYMPHOCYTES # BLD AUTO: 2.04 K/UL (ref 1–4.8)
LYMPHOCYTES NFR BLD: 22.5 % (ref 22–41)
MCH RBC QN AUTO: 27.3 PG (ref 27–33)
MCHC RBC AUTO-ENTMCNC: 32.6 G/DL (ref 33.6–35)
MCV RBC AUTO: 83.7 FL (ref 81.4–97.8)
MICRO URNS: ABNORMAL
MONOCYTES # BLD AUTO: 0.51 K/UL (ref 0–0.85)
MONOCYTES NFR BLD AUTO: 5.6 % (ref 0–13.4)
NEUTROPHILS # BLD AUTO: 6.36 K/UL (ref 2–7.15)
NEUTROPHILS NFR BLD: 70.1 % (ref 44–72)
NITRITE UR QL STRIP.AUTO: NEGATIVE
NRBC # BLD AUTO: 0 K/UL
NRBC BLD-RTO: 0 /100 WBC
PH UR STRIP.AUTO: 5.5 [PH] (ref 5–8)
PLATELET # BLD AUTO: 276 K/UL (ref 164–446)
PMV BLD AUTO: 9.9 FL (ref 9–12.9)
POTASSIUM SERPL-SCNC: 3.9 MMOL/L (ref 3.6–5.5)
PROT SERPL-MCNC: 7.8 G/DL (ref 6–8.2)
PROT UR QL STRIP: NEGATIVE MG/DL
RBC # BLD AUTO: 5.23 M/UL (ref 4.2–5.4)
RBC # URNS HPF: NORMAL /HPF
RBC UR QL AUTO: NEGATIVE
SODIUM SERPL-SCNC: 135 MMOL/L (ref 135–145)
SP GR UR STRIP.AUTO: 1.02
UROBILINOGEN UR STRIP.AUTO-MCNC: 0.2 MG/DL
WBC # BLD AUTO: 9.1 K/UL (ref 4.8–10.8)
WBC #/AREA URNS HPF: NORMAL /HPF

## 2022-07-10 PROCEDURE — 99284 EMERGENCY DEPT VISIT MOD MDM: CPT

## 2022-07-10 PROCEDURE — 83690 ASSAY OF LIPASE: CPT

## 2022-07-10 PROCEDURE — 80053 COMPREHEN METABOLIC PANEL: CPT

## 2022-07-10 PROCEDURE — 85025 COMPLETE CBC W/AUTO DIFF WBC: CPT

## 2022-07-10 PROCEDURE — 81001 URINALYSIS AUTO W/SCOPE: CPT

## 2022-07-10 PROCEDURE — 76801 OB US < 14 WKS SINGLE FETUS: CPT

## 2022-07-10 PROCEDURE — A9270 NON-COVERED ITEM OR SERVICE: HCPCS | Performed by: EMERGENCY MEDICINE

## 2022-07-10 PROCEDURE — 36415 COLL VENOUS BLD VENIPUNCTURE: CPT

## 2022-07-10 PROCEDURE — 84702 CHORIONIC GONADOTROPIN TEST: CPT

## 2022-07-10 PROCEDURE — 700102 HCHG RX REV CODE 250 W/ 637 OVERRIDE(OP): Performed by: EMERGENCY MEDICINE

## 2022-07-10 RX ORDER — ACETAMINOPHEN 325 MG/1
650 TABLET ORAL ONCE
Status: COMPLETED | OUTPATIENT
Start: 2022-07-10 | End: 2022-07-10

## 2022-07-10 RX ORDER — DOXYLAMINE SUCCINATE AND PYRIDOXINE HYDROCHLORIDE, DELAYED RELEASE TABLETS 10 MG/10 MG 10; 10 MG/1; MG/1
1 TABLET, DELAYED RELEASE ORAL 4 TIMES DAILY PRN
Qty: 30 TABLET | Refills: 0 | Status: SHIPPED | OUTPATIENT
Start: 2022-07-10 | End: 2022-08-29 | Stop reason: SDUPTHER

## 2022-07-10 RX ORDER — POLYETHYLENE GLYCOL 3350 17 G/17G
17 POWDER, FOR SOLUTION ORAL DAILY
Qty: 20 EACH | Refills: 0 | Status: SHIPPED | OUTPATIENT
Start: 2022-07-10 | End: 2023-01-05

## 2022-07-10 RX ADMIN — ACETAMINOPHEN 650 MG: 325 TABLET, FILM COATED ORAL at 13:39

## 2022-07-10 ASSESSMENT — FIBROSIS 4 INDEX: FIB4 SCORE: 0.42

## 2022-07-10 NOTE — ED NOTES
Pt discharged home as ordered by erp. Pt instructed to follow up with OB and return here as needed. Pt verbalized understanding. Pt left ambulating independently in NAD. Pt instructed on where  To  RXs

## 2022-07-10 NOTE — ED PROVIDER NOTES
ED Provider Note    ED Provider Note    Primary care provider: Pcp Pt States None  Means of arrival: EMS  History obtained from: Patient    CHIEF COMPLAINT  Chief Complaint   Patient presents with   • Constipation   • Abdominal Cramping     Seen at 12:30 PM.   HPI  Stephanie Farnsworth is a 26 y.o. female G7P 3033 EGA 9 weeks by LMP who presents to the Emergency Department for abdominal cramping and constipation.  The patient has felt significant constipation over the past 48 hours as well as diffuse moderate abdominal cramping.  She tried digital impaction at home and this was unsuccessful.  While she was in the waiting room she was able to have a large hard nonblack, nonbloody bowel movement.  She does feel somewhat improved but still notes some abdominal cramping.  She has follow-up with her OB in about 48 hours, she has not had a confirmed IUP yet.    She denies any fevers, chills, cough, chest pain, shortness of breath.  She did have morning vomiting after breakfast.  She does have a history of Hashimoto's and has been fatigued of late.    REVIEW OF SYSTEMS  See HPI,   Remainder of ROS negative.     PAST MEDICAL HISTORY   has a past medical history of GDM, class A2 (2021), Hashimoto's disease, Hashimoto's disease (2019), Infertility, female, Miscarriage (), and Unspecified asthma(493.90).    SURGICAL HISTORY   has a past surgical history that includes ercp in or (2014); appendectomy (2015); tony by laparoscopy (2014); primary c section (2016); primary c section; and repeat c section (Bilateral, 2021).    SOCIAL HISTORY  Social History     Tobacco Use   • Smoking status: Former Smoker     Packs/day: 0.10     Years: 1.00     Pack years: 0.10     Types: Cigarettes     Quit date: 10/9/2012     Years since quittin.7   • Smokeless tobacco: Never Used   Vaping Use   • Vaping Use: Never used   Substance Use Topics   • Alcohol use: Not Currently     Comment: Occadional beer, not  during the pregnancy   • Drug use: No      Social History     Substance and Sexual Activity   Drug Use No       FAMILY HISTORY  Family History   Problem Relation Age of Onset   • Diabetes Mother    • Hypertension Mother        CURRENT MEDICATIONS  Reviewed.  See Encounter Summary.     ALLERGIES  Allergies   Allergen Reactions   • Corn Oil      All corn. May cause inflammation.       PHYSICAL EXAM  VITAL SIGNS: /68   Pulse 64   Temp 36.4 °C (97.5 °F) (Temporal)   Resp 18   Ht 1.524 m (5')   Wt 94 kg (207 lb 3.7 oz)   LMP 05/07/2022 (Exact Date)   SpO2 99%   BMI 40.47 kg/m²   Constitutional: Awake, alert in no apparent distress.  HENT: Normocephalic, Bilateral external ears normal. Nose normal.   Eyes: Conjunctiva normal, non-icteric, EOMI.    Thorax & Lungs: Easy unlabored respirations, Clear to ascultation bilaterally.  Cardiovascular: Regular rate, Regular rhythm, No murmurs, rubs or gallops. Bilateral pulses symmetrical.   Abdomen:  Soft, nontender, nondistended, normal active bowel sounds.   :    Skin: Visualized skin is  Dry, No erythema, No rash.   Musculoskeletal:   No cyanosis, clubbing or edema. No leg asymmetry.   Neurologic: Alert, Grossly non-focal.   Psychiatric: Normal affect, Normal mood  Lymphatic:  No cervical LAD      RADIOLOGY  US-OB 1ST TRIMESTER SINGLE GEST Is the patient pregnant? Yes   Final Result      1.  Single live intrauterine pregnancy at 9 weeks, 0 days estimated gestational age.   2.  The patient declined transvaginal imaging.            COURSE & MEDICAL DECISION MAKING  Pertinent Labs & Imaging studies reviewed. (See chart for details)    Differential diagnoses include but are not limited to: Constipation, threatened miscarriage, ectopic pregnancy    12:30 PM - Medical record reviewed, history of Hashimoto's disease, previously followed in [place] for routine OB and primary care.  Patient seen and examined at bedside.    Decision Making:  Suspect is a pleasant 26 y.o.  year old female who presents with constipation.  Differential as above.  IUP confirmed on transabdominal ultrasound.  Urinalysis without any evidence of infection.  The patient has a benign abdominal examination today, reassuring labs.  Do not suspect acute appendicitis.  Was able to have a large BM in the waiting room and felt somewhat better after this.  Suspect that she has had some constipation secondary to some mild dehydration as she has been vomiting in addition to less activity due to fatigue from Hashimoto's.  If the patient drinks more fluids, ambulates, antiemetics for the morning sickness we should be able to keep her from having any additional constipation.  MiraLAX prescribed as well.  Discharge Medications:  Discharge Medication List as of 7/10/2022  2:41 PM      START taking these medications    Details   polyethylene glycol/lytes (MIRALAX) 17 g Pack Take 1 Packet by mouth every day., Disp-20 Each, R-0, Normal      Doxylamine-Pyridoxine (DICLEGIS) 10-10 MG Tablet Delayed Response delayed-release tablet Take 1 Tablet by mouth 4 times a day as needed (vomiting)., Disp-30 Tablet, R-0, Normal             The patient was discharged home (see d/c instructions) was told to return immediately for any signs or symptoms listed, or any worsening at all.  The patient verbally agreed to the discharge precautions and follow-up plan which is documented in EPIC.        FINAL IMPRESSION  1. 9 weeks gestation of pregnancy    2. Constipation, unspecified constipation type

## 2022-07-10 NOTE — ED TRIAGE NOTES
BIB EMS to triage w/ c/o constipation x 1 day.  Reports abd cramping x 1 wk.  Pt is 9 wks 1 day pregnant.  Denies vaginal bleeding.  Pt attempted to digitally disimpact w/ minimal relief.  Pt appears uncomfortable.

## 2022-07-26 PROBLEM — Z87.59 HISTORY OF CHOLESTASIS DURING PREGNANCY: Status: ACTIVE | Noted: 2022-07-26

## 2022-07-26 PROBLEM — Z87.19 HISTORY OF CHOLESTASIS DURING PREGNANCY: Status: ACTIVE | Noted: 2022-07-26

## 2022-07-26 PROBLEM — O09.299 HISTORY OF GESTATIONAL DIABETES IN PRIOR PREGNANCY, CURRENTLY PREGNANT: Status: ACTIVE | Noted: 2022-07-26

## 2022-07-26 PROBLEM — O99.210 OBESITY IN PREGNANCY, ANTEPARTUM, UNSPECIFIED TRIMESTER: Status: ACTIVE | Noted: 2021-04-29

## 2022-07-26 PROBLEM — Z86.32 HISTORY OF GESTATIONAL DIABETES IN PRIOR PREGNANCY, CURRENTLY PREGNANT: Status: ACTIVE | Noted: 2022-07-26

## 2022-11-27 PROBLEM — O26.612 CHOLESTASIS DURING PREGNANCY IN SECOND TRIMESTER: Status: ACTIVE | Noted: 2021-04-16

## 2023-01-23 PROBLEM — O26.613 CHOLESTASIS DURING PREGNANCY IN THIRD TRIMESTER: Status: ACTIVE | Noted: 2023-01-23

## 2023-01-23 PROBLEM — K83.1 CHOLESTASIS DURING PREGNANCY IN THIRD TRIMESTER: Status: ACTIVE | Noted: 2023-01-23

## 2023-02-08 PROBLEM — O26.612 CHOLESTASIS DURING PREGNANCY IN SECOND TRIMESTER: Status: RESOLVED | Noted: 2021-04-16 | Resolved: 2023-02-08

## 2023-02-08 PROBLEM — K83.1 CHOLESTASIS DURING PREGNANCY IN THIRD TRIMESTER: Status: RESOLVED | Noted: 2023-01-23 | Resolved: 2023-02-08

## 2023-02-08 PROBLEM — O09.299 HISTORY OF GESTATIONAL DIABETES IN PRIOR PREGNANCY, CURRENTLY PREGNANT: Status: RESOLVED | Noted: 2022-07-26 | Resolved: 2023-02-08

## 2023-02-08 PROBLEM — J45.909 ASTHMA: Status: ACTIVE | Noted: 2023-02-08

## 2023-02-08 PROBLEM — Z86.32 HISTORY OF GESTATIONAL DIABETES MELLITUS (GDM): Status: ACTIVE | Noted: 2023-02-08

## 2023-02-08 PROBLEM — Z86.32 HISTORY OF GESTATIONAL DIABETES IN PRIOR PREGNANCY, CURRENTLY PREGNANT: Status: RESOLVED | Noted: 2022-07-26 | Resolved: 2023-02-08

## 2023-02-08 PROBLEM — O99.210 OBESITY IN PREGNANCY, ANTEPARTUM, UNSPECIFIED TRIMESTER: Status: RESOLVED | Noted: 2021-04-29 | Resolved: 2023-02-08

## 2023-02-08 PROBLEM — O24.419 GDM, CLASS A2: Status: RESOLVED | Noted: 2021-04-29 | Resolved: 2023-02-08

## 2023-02-08 PROBLEM — O26.613 CHOLESTASIS DURING PREGNANCY IN THIRD TRIMESTER: Status: RESOLVED | Noted: 2023-01-23 | Resolved: 2023-02-08

## 2023-02-08 PROBLEM — K83.1 CHOLESTASIS DURING PREGNANCY IN SECOND TRIMESTER: Status: RESOLVED | Noted: 2021-04-16 | Resolved: 2023-02-08

## (undated) DEVICE — GLOVE BIOGEL SZ 7 SURGICAL PF LTX - (50PR/BX 4BX/CA)

## (undated) DEVICE — DETERGENT RENUZYME PLUS 10 OZ PACKET (50/BX)

## (undated) DEVICE — HEAD HOLDER JUNIOR/ADULT

## (undated) DEVICE — BAG SPONGE COUNT 10.25 X 32 - BLUE (250/CA)

## (undated) DEVICE — TRAY SPINAL ANESTHESIA NON-SAFETY (10/CA)

## (undated) DEVICE — SPONGE GAUZESTER 4 X 4 4PLY - (128PK/CA)

## (undated) DEVICE — PENCIL ELECTSURG 10FT HLSTR - WITH BLADE (50EA/CA)

## (undated) DEVICE — SET EXTENSION WITH 2 PORTS (48EA/CA) ***PART #2C8610 IS A SUBSTITUTE*****

## (undated) DEVICE — CATHETER IV NON-SAFETY 18 GA X 1 1/4 (50/BX 4BX/CA)

## (undated) DEVICE — PAD LAP STERILE 18 X 18 - (5/PK 40PK/CA)

## (undated) DEVICE — WATER IRRIGATION STERILE 1000ML (12EA/CA)

## (undated) DEVICE — DRESSING NON-ADHERING 8 X 3 - (50/BX)

## (undated) DEVICE — SUTURE 0 VICRYL PLUS CT-1 - 36 INCH (36/BX)

## (undated) DEVICE — GLOVE, BIOGEL ECLIPSE, SZ 7.0, PF LTX (50/BX)

## (undated) DEVICE — PACK ROOM TURNOVER L&D (12/CA)

## (undated) DEVICE — SODIUM CHL IRRIGATION 0.9% 1000ML (12EA/CA)

## (undated) DEVICE — ELECTRODE DUAL RETURN W/ CORD - (50/PK)

## (undated) DEVICE — KIT  I.V. START (100EA/CA)

## (undated) DEVICE — TUBING CLEARLINK DUO-VENT - C-FLO (48EA/CA)

## (undated) DEVICE — SOLUTION PLASMA-LYTE PH 7.4 INJ 1000ML  (14EA/CA)

## (undated) DEVICE — STAPLER SKIN DISP - (6/BX 10BX/CA) VISISTAT

## (undated) DEVICE — PACK C-SECTION (2EA/CA)

## (undated) DEVICE — TAPE CLOTH MEDIPORE 6 INCH - (12RL/CA)